# Patient Record
Sex: MALE | Race: WHITE | NOT HISPANIC OR LATINO | Employment: OTHER | URBAN - NONMETROPOLITAN AREA
[De-identification: names, ages, dates, MRNs, and addresses within clinical notes are randomized per-mention and may not be internally consistent; named-entity substitution may affect disease eponyms.]

---

## 2017-01-01 ENCOUNTER — INFUSION (OUTPATIENT)
Dept: ONCOLOGY | Facility: HOSPITAL | Age: 82
End: 2017-01-01

## 2017-01-01 ENCOUNTER — APPOINTMENT (OUTPATIENT)
Dept: GENERAL RADIOLOGY | Facility: HOSPITAL | Age: 82
End: 2017-01-01

## 2017-01-01 ENCOUNTER — APPOINTMENT (OUTPATIENT)
Dept: NUCLEAR MEDICINE | Facility: HOSPITAL | Age: 82
End: 2017-01-01

## 2017-01-01 ENCOUNTER — HOSPITAL ENCOUNTER (INPATIENT)
Facility: HOSPITAL | Age: 82
LOS: 4 days | Discharge: HOME HEALTH | End: 2017-05-25
Attending: INTERNAL MEDICINE | Admitting: FAMILY MEDICINE

## 2017-01-01 ENCOUNTER — OFFICE VISIT (OUTPATIENT)
Dept: ONCOLOGY | Facility: CLINIC | Age: 82
End: 2017-01-01

## 2017-01-01 ENCOUNTER — APPOINTMENT (OUTPATIENT)
Dept: CT IMAGING | Facility: HOSPITAL | Age: 82
End: 2017-01-01

## 2017-01-01 ENCOUNTER — HOSPITAL ENCOUNTER (EMERGENCY)
Facility: HOSPITAL | Age: 82
Discharge: HOME | End: 2017-06-15
Attending: EMERGENCY MEDICINE | Admitting: EMERGENCY MEDICINE

## 2017-01-01 ENCOUNTER — OFFICE VISIT (OUTPATIENT)
Dept: SURGERY | Facility: CLINIC | Age: 82
End: 2017-01-01

## 2017-01-01 ENCOUNTER — OFFICE VISIT (OUTPATIENT)
Dept: PULMONOLOGY | Facility: CLINIC | Age: 82
End: 2017-01-01

## 2017-01-01 ENCOUNTER — APPOINTMENT (OUTPATIENT)
Dept: CARDIOLOGY | Facility: HOSPITAL | Age: 82
End: 2017-01-01
Attending: INTERNAL MEDICINE

## 2017-01-01 ENCOUNTER — APPOINTMENT (OUTPATIENT)
Dept: ULTRASOUND IMAGING | Facility: HOSPITAL | Age: 82
End: 2017-01-01

## 2017-01-01 ENCOUNTER — ANESTHESIA EVENT (OUTPATIENT)
Dept: PERIOP | Facility: HOSPITAL | Age: 82
End: 2017-01-01

## 2017-01-01 ENCOUNTER — HOSPITAL ENCOUNTER (EMERGENCY)
Facility: HOSPITAL | Age: 82
Discharge: HOME | End: 2017-06-02
Attending: EMERGENCY MEDICINE | Admitting: EMERGENCY MEDICINE

## 2017-01-01 ENCOUNTER — TELEPHONE (OUTPATIENT)
Dept: ONCOLOGY | Facility: HOSPITAL | Age: 82
End: 2017-01-01

## 2017-01-01 ENCOUNTER — HOSPITAL ENCOUNTER (OUTPATIENT)
Dept: CT IMAGING | Facility: HOSPITAL | Age: 82
Discharge: HOME | End: 2017-08-15
Attending: INTERNAL MEDICINE | Admitting: INTERNAL MEDICINE

## 2017-01-01 ENCOUNTER — HOSPITAL ENCOUNTER (INPATIENT)
Facility: HOSPITAL | Age: 82
LOS: 9 days | Discharge: HOME | End: 2017-05-21
Attending: EMERGENCY MEDICINE | Admitting: INTERNAL MEDICINE

## 2017-01-01 ENCOUNTER — LAB (OUTPATIENT)
Dept: ONCOLOGY | Facility: CLINIC | Age: 82
End: 2017-01-01

## 2017-01-01 ENCOUNTER — ANESTHESIA (OUTPATIENT)
Dept: PERIOP | Facility: HOSPITAL | Age: 82
End: 2017-01-01

## 2017-01-01 ENCOUNTER — CONSULT (OUTPATIENT)
Dept: ONCOLOGY | Facility: CLINIC | Age: 82
End: 2017-01-01

## 2017-01-01 ENCOUNTER — HOSPITAL ENCOUNTER (OUTPATIENT)
Dept: CT IMAGING | Facility: HOSPITAL | Age: 82
Discharge: HOME | End: 2017-08-15
Attending: INTERNAL MEDICINE

## 2017-01-01 VITALS
HEART RATE: 106 BPM | WEIGHT: 128.8 LBS | HEIGHT: 66 IN | SYSTOLIC BLOOD PRESSURE: 132 MMHG | TEMPERATURE: 96.8 F | BODY MASS INDEX: 20.7 KG/M2 | OXYGEN SATURATION: 95 % | RESPIRATION RATE: 18 BRPM | DIASTOLIC BLOOD PRESSURE: 68 MMHG

## 2017-01-01 VITALS
SYSTOLIC BLOOD PRESSURE: 109 MMHG | TEMPERATURE: 97.9 F | BODY MASS INDEX: 18.85 KG/M2 | HEART RATE: 101 BPM | DIASTOLIC BLOOD PRESSURE: 67 MMHG | HEART RATE: 101 BPM | WEIGHT: 116.8 LBS | TEMPERATURE: 97.9 F | SYSTOLIC BLOOD PRESSURE: 109 MMHG | RESPIRATION RATE: 18 BRPM | RESPIRATION RATE: 18 BRPM | OXYGEN SATURATION: 98 % | BODY MASS INDEX: 18.85 KG/M2 | WEIGHT: 116.8 LBS | OXYGEN SATURATION: 98 % | DIASTOLIC BLOOD PRESSURE: 67 MMHG

## 2017-01-01 VITALS
BODY MASS INDEX: 20.25 KG/M2 | DIASTOLIC BLOOD PRESSURE: 70 MMHG | HEART RATE: 94 BPM | HEIGHT: 66 IN | OXYGEN SATURATION: 96 % | SYSTOLIC BLOOD PRESSURE: 147 MMHG | WEIGHT: 126 LBS

## 2017-01-01 VITALS
RESPIRATION RATE: 18 BRPM | SYSTOLIC BLOOD PRESSURE: 135 MMHG | DIASTOLIC BLOOD PRESSURE: 54 MMHG | OXYGEN SATURATION: 97 % | WEIGHT: 127.1 LBS | BODY MASS INDEX: 20.43 KG/M2 | TEMPERATURE: 97.1 F | HEART RATE: 93 BPM | HEIGHT: 66 IN

## 2017-01-01 VITALS — HEIGHT: 66 IN | BODY MASS INDEX: 20.57 KG/M2 | WEIGHT: 128 LBS

## 2017-01-01 VITALS
DIASTOLIC BLOOD PRESSURE: 61 MMHG | HEART RATE: 79 BPM | RESPIRATION RATE: 18 BRPM | SYSTOLIC BLOOD PRESSURE: 106 MMHG | OXYGEN SATURATION: 78 % | TEMPERATURE: 97.6 F

## 2017-01-01 VITALS
DIASTOLIC BLOOD PRESSURE: 70 MMHG | SYSTOLIC BLOOD PRESSURE: 121 MMHG | BODY MASS INDEX: 19.53 KG/M2 | HEART RATE: 91 BPM | WEIGHT: 121 LBS | OXYGEN SATURATION: 98 % | RESPIRATION RATE: 18 BRPM

## 2017-01-01 VITALS
TEMPERATURE: 97.2 F | DIASTOLIC BLOOD PRESSURE: 70 MMHG | RESPIRATION RATE: 18 BRPM | WEIGHT: 120 LBS | BODY MASS INDEX: 19.37 KG/M2 | BODY MASS INDEX: 19.53 KG/M2 | HEART RATE: 93 BPM | OXYGEN SATURATION: 98 % | HEART RATE: 91 BPM | RESPIRATION RATE: 20 BRPM | SYSTOLIC BLOOD PRESSURE: 121 MMHG | DIASTOLIC BLOOD PRESSURE: 78 MMHG | SYSTOLIC BLOOD PRESSURE: 130 MMHG | WEIGHT: 121 LBS | OXYGEN SATURATION: 100 %

## 2017-01-01 VITALS
DIASTOLIC BLOOD PRESSURE: 62 MMHG | HEIGHT: 66 IN | BODY MASS INDEX: 21.53 KG/M2 | RESPIRATION RATE: 18 BRPM | WEIGHT: 134 LBS | SYSTOLIC BLOOD PRESSURE: 128 MMHG | TEMPERATURE: 98 F | OXYGEN SATURATION: 99 % | HEART RATE: 88 BPM

## 2017-01-01 VITALS
DIASTOLIC BLOOD PRESSURE: 98 MMHG | OXYGEN SATURATION: 97 % | RESPIRATION RATE: 18 BRPM | HEART RATE: 96 BPM | TEMPERATURE: 97.7 F | BODY MASS INDEX: 21.45 KG/M2 | WEIGHT: 133.5 LBS | SYSTOLIC BLOOD PRESSURE: 136 MMHG | HEIGHT: 66 IN

## 2017-01-01 VITALS
SYSTOLIC BLOOD PRESSURE: 107 MMHG | HEART RATE: 88 BPM | TEMPERATURE: 97.9 F | OXYGEN SATURATION: 98 % | DIASTOLIC BLOOD PRESSURE: 61 MMHG | RESPIRATION RATE: 18 BRPM | WEIGHT: 121.6 LBS | BODY MASS INDEX: 19.63 KG/M2

## 2017-01-01 VITALS — BODY MASS INDEX: 20.57 KG/M2 | HEIGHT: 66 IN | WEIGHT: 128 LBS

## 2017-01-01 VITALS
OXYGEN SATURATION: 78 % | HEART RATE: 79 BPM | TEMPERATURE: 97.6 F | RESPIRATION RATE: 18 BRPM | DIASTOLIC BLOOD PRESSURE: 61 MMHG | WEIGHT: 126.6 LBS | BODY MASS INDEX: 20.43 KG/M2 | SYSTOLIC BLOOD PRESSURE: 106 MMHG

## 2017-01-01 VITALS
SYSTOLIC BLOOD PRESSURE: 122 MMHG | TEMPERATURE: 97 F | DIASTOLIC BLOOD PRESSURE: 67 MMHG | BODY MASS INDEX: 20.47 KG/M2 | HEART RATE: 99 BPM | RESPIRATION RATE: 18 BRPM | WEIGHT: 126.8 LBS | OXYGEN SATURATION: 97 %

## 2017-01-01 VITALS
DIASTOLIC BLOOD PRESSURE: 77 MMHG | BODY MASS INDEX: 20.57 KG/M2 | TEMPERATURE: 97.7 F | WEIGHT: 128 LBS | RESPIRATION RATE: 18 BRPM | HEIGHT: 66 IN | HEART RATE: 80 BPM | OXYGEN SATURATION: 98 % | SYSTOLIC BLOOD PRESSURE: 145 MMHG

## 2017-01-01 DIAGNOSIS — Z85.46 HISTORY OF PROSTATE CANCER: ICD-10-CM

## 2017-01-01 DIAGNOSIS — J90 PLEURAL EFFUSION, LEFT: Primary | ICD-10-CM

## 2017-01-01 DIAGNOSIS — C61 PROSTATE CANCER METASTATIC TO BONE (HCC): ICD-10-CM

## 2017-01-01 DIAGNOSIS — C61 STAGE IV ADENOCARCINOMA OF PROSTATE (HCC): ICD-10-CM

## 2017-01-01 DIAGNOSIS — C61 MALIGNANT NEOPLASM OF PROSTATE (HCC): Primary | ICD-10-CM

## 2017-01-01 DIAGNOSIS — C79.51 PROSTATE CANCER METASTATIC TO BONE (HCC): Primary | ICD-10-CM

## 2017-01-01 DIAGNOSIS — Z78.9 IMPAIRED MOBILITY AND ADLS: ICD-10-CM

## 2017-01-01 DIAGNOSIS — C61 PROSTATE CANCER METASTATIC TO BONE (HCC): Primary | ICD-10-CM

## 2017-01-01 DIAGNOSIS — J95.89 OTHER POSTOPERATIVE COMPLICATION INVOLVING RESPIRATORY SYSTEM: ICD-10-CM

## 2017-01-01 DIAGNOSIS — C79.51 PROSTATE CANCER METASTATIC TO BONE (HCC): ICD-10-CM

## 2017-01-01 DIAGNOSIS — J90 PLEURAL EFFUSION, LEFT: ICD-10-CM

## 2017-01-01 DIAGNOSIS — C61 STAGE IV ADENOCARCINOMA OF PROSTATE (HCC): Primary | ICD-10-CM

## 2017-01-01 DIAGNOSIS — R91.8 LUNG MASS: Primary | ICD-10-CM

## 2017-01-01 DIAGNOSIS — R06.00 DYSPNEA, UNSPECIFIED TYPE: ICD-10-CM

## 2017-01-01 DIAGNOSIS — C61 MALIGNANT NEOPLASM OF PROSTATE (HCC): ICD-10-CM

## 2017-01-01 DIAGNOSIS — Z48.89 SUTURE CHECK: Primary | ICD-10-CM

## 2017-01-01 DIAGNOSIS — Z74.09 IMPAIRED FUNCTIONAL MOBILITY, BALANCE, GAIT, AND ENDURANCE: Primary | ICD-10-CM

## 2017-01-01 DIAGNOSIS — J90 PLEURAL EFFUSION: ICD-10-CM

## 2017-01-01 DIAGNOSIS — E55.9 VITAMIN D DEFICIENCY: ICD-10-CM

## 2017-01-01 DIAGNOSIS — R97.20 ELEVATED PSA: ICD-10-CM

## 2017-01-01 DIAGNOSIS — J90 RECURRENT LEFT PLEURAL EFFUSION: Primary | ICD-10-CM

## 2017-01-01 DIAGNOSIS — E87.1 HYPONATREMIA: ICD-10-CM

## 2017-01-01 DIAGNOSIS — Z74.09 IMPAIRED MOBILITY AND ADLS: ICD-10-CM

## 2017-01-01 DIAGNOSIS — J91.0 MALIGNANT PLEURAL EFFUSION: Primary | ICD-10-CM

## 2017-01-01 LAB
25(OH)D3 SERPL-MCNC: 30 NG/ML
6-ACETYL MORPHINE: NEGATIVE
A-A DO2: 25 MMHG (ref 0–300)
A-A DO2: 66.5 MMHG (ref 0–300)
ACID FAST STN SPEC: NEGATIVE
ALBUMIN SERPL-MCNC: 2.6 G/DL (ref 3.4–4.8)
ALBUMIN SERPL-MCNC: 2.7 G/DL (ref 3.2–4.8)
ALBUMIN SERPL-MCNC: 2.7 G/DL (ref 3.4–4.8)
ALBUMIN SERPL-MCNC: 2.7 G/DL (ref 3.4–4.8)
ALBUMIN SERPL-MCNC: 2.8 G/DL (ref 3.4–4.8)
ALBUMIN SERPL-MCNC: 2.9 G/DL (ref 3.4–4.8)
ALBUMIN SERPL-MCNC: 2.9 G/DL (ref 3.4–4.8)
ALBUMIN SERPL-MCNC: 3 G/DL (ref 3.4–4.8)
ALBUMIN SERPL-MCNC: 3.2 G/DL (ref 3.4–4.8)
ALBUMIN SERPL-MCNC: 3.2 G/DL (ref 3.4–4.8)
ALBUMIN SERPL-MCNC: 3.3 G/DL (ref 3.4–4.8)
ALBUMIN SERPL-MCNC: 3.4 G/DL (ref 3.4–4.8)
ALBUMIN SERPL-MCNC: 3.4 G/DL (ref 3.4–4.8)
ALBUMIN SERPL-MCNC: 3.6 G/DL (ref 3.4–4.8)
ALBUMIN SERPL-MCNC: 4 G/DL (ref 3.4–4.8)
ALBUMIN SERPL-MCNC: 4.1 G/DL (ref 3.4–4.8)
ALBUMIN SERPL-MCNC: 4.1 G/DL (ref 3.4–4.8)
ALBUMIN/GLOB SERPL: 1 G/DL (ref 1.5–2.5)
ALBUMIN/GLOB SERPL: 1.1 G/DL (ref 1.5–2.5)
ALBUMIN/GLOB SERPL: 1.2 G/DL (ref 1.5–2.5)
ALBUMIN/GLOB SERPL: 1.3 G/DL (ref 1.5–2.5)
ALP SERPL-CCNC: 103 U/L (ref 25–100)
ALP SERPL-CCNC: 105 U/L (ref 40–129)
ALP SERPL-CCNC: 108 U/L (ref 40–129)
ALP SERPL-CCNC: 109 U/L (ref 40–129)
ALP SERPL-CCNC: 113 U/L (ref 40–129)
ALP SERPL-CCNC: 115 U/L (ref 40–129)
ALP SERPL-CCNC: 119 U/L (ref 40–129)
ALP SERPL-CCNC: 120 U/L (ref 40–129)
ALP SERPL-CCNC: 127 U/L (ref 40–129)
ALP SERPL-CCNC: 137 U/L (ref 40–129)
ALP SERPL-CCNC: 157 U/L (ref 40–129)
ALP SERPL-CCNC: 163 U/L (ref 40–129)
ALP SERPL-CCNC: 163 U/L (ref 40–129)
ALP SERPL-CCNC: 178 U/L (ref 40–129)
ALP SERPL-CCNC: 234 U/L (ref 40–129)
ALP SERPL-CCNC: 273 U/L (ref 40–129)
ALP SERPL-CCNC: 314 U/L (ref 40–129)
ALP SERPL-CCNC: 359 U/L (ref 40–129)
ALP SERPL-CCNC: 387 U/L (ref 40–129)
ALT SERPL W P-5'-P-CCNC: 11 U/L (ref 10–44)
ALT SERPL W P-5'-P-CCNC: 12 U/L (ref 10–44)
ALT SERPL W P-5'-P-CCNC: 13 U/L (ref 10–44)
ALT SERPL W P-5'-P-CCNC: 13 U/L (ref 10–44)
ALT SERPL W P-5'-P-CCNC: 16 U/L (ref 10–44)
ALT SERPL W P-5'-P-CCNC: 17 U/L (ref 10–44)
ALT SERPL W P-5'-P-CCNC: 18 U/L (ref 10–44)
ALT SERPL W P-5'-P-CCNC: 18 U/L (ref 7–40)
ALT SERPL W P-5'-P-CCNC: 19 U/L (ref 10–44)
ALT SERPL W P-5'-P-CCNC: 19 U/L (ref 10–44)
ALT SERPL W P-5'-P-CCNC: 20 U/L (ref 10–44)
ALT SERPL W P-5'-P-CCNC: 29 U/L (ref 10–44)
AMPHET+METHAMPHET UR QL: NEGATIVE
ANION GAP SERPL CALCULATED.3IONS-SCNC: 1.2 MMOL/L (ref 3.6–11.2)
ANION GAP SERPL CALCULATED.3IONS-SCNC: 1.3 MMOL/L (ref 3.6–11.2)
ANION GAP SERPL CALCULATED.3IONS-SCNC: 2.3 MMOL/L (ref 3.6–11.2)
ANION GAP SERPL CALCULATED.3IONS-SCNC: 2.8 MMOL/L (ref 3.6–11.2)
ANION GAP SERPL CALCULATED.3IONS-SCNC: 4 MMOL/L (ref 3–11)
ANION GAP SERPL CALCULATED.3IONS-SCNC: 4 MMOL/L (ref 3–11)
ANION GAP SERPL CALCULATED.3IONS-SCNC: 4.1 MMOL/L (ref 3.6–11.2)
ANION GAP SERPL CALCULATED.3IONS-SCNC: 4.2 MMOL/L (ref 3.6–11.2)
ANION GAP SERPL CALCULATED.3IONS-SCNC: 5.1 MMOL/L (ref 3.6–11.2)
ANION GAP SERPL CALCULATED.3IONS-SCNC: 5.1 MMOL/L (ref 3.6–11.2)
ANION GAP SERPL CALCULATED.3IONS-SCNC: 5.7 MMOL/L (ref 3.6–11.2)
ANION GAP SERPL CALCULATED.3IONS-SCNC: 6.1 MMOL/L (ref 3.6–11.2)
ANION GAP SERPL CALCULATED.3IONS-SCNC: 6.3 MMOL/L (ref 3.6–11.2)
ANION GAP SERPL CALCULATED.3IONS-SCNC: 6.4 MMOL/L (ref 3.6–11.2)
ANION GAP SERPL CALCULATED.3IONS-SCNC: 6.5 MMOL/L (ref 3.6–11.2)
ANION GAP SERPL CALCULATED.3IONS-SCNC: 7.2 MMOL/L (ref 3.6–11.2)
ANION GAP SERPL CALCULATED.3IONS-SCNC: 7.5 MMOL/L (ref 3.6–11.2)
ANION GAP SERPL CALCULATED.3IONS-SCNC: 7.7 MMOL/L (ref 3.6–11.2)
ANION GAP SERPL CALCULATED.3IONS-SCNC: 7.8 MMOL/L (ref 3.6–11.2)
ANION GAP SERPL CALCULATED.3IONS-SCNC: 8 MMOL/L (ref 3–11)
ANION GAP SERPL CALCULATED.3IONS-SCNC: 9.1 MMOL/L (ref 3.6–11.2)
ANISOCYTOSIS BLD QL: NORMAL
APPEARANCE FLD: ABNORMAL
APTT PPP: 25.4 SECONDS (ref 24.4–31)
APTT PPP: 26.6 SECONDS (ref 24.4–31)
ARTERIAL PATENCY WRIST A: POSITIVE
ARTERIAL PATENCY WRIST A: POSITIVE
ARTICHOKE IGE QN: 60 MG/DL (ref 0–130)
AST SERPL-CCNC: 17 U/L (ref 10–34)
AST SERPL-CCNC: 18 U/L (ref 10–34)
AST SERPL-CCNC: 18 U/L (ref 10–34)
AST SERPL-CCNC: 19 U/L (ref 10–34)
AST SERPL-CCNC: 20 U/L (ref 10–34)
AST SERPL-CCNC: 20 U/L (ref 10–34)
AST SERPL-CCNC: 21 U/L (ref 10–34)
AST SERPL-CCNC: 22 U/L (ref 10–34)
AST SERPL-CCNC: 22 U/L (ref 10–34)
AST SERPL-CCNC: 23 U/L (ref 10–34)
AST SERPL-CCNC: 23 U/L (ref 10–34)
AST SERPL-CCNC: 24 U/L (ref 0–33)
AST SERPL-CCNC: 24 U/L (ref 10–34)
AST SERPL-CCNC: 27 U/L (ref 10–34)
AST SERPL-CCNC: 27 U/L (ref 10–34)
AST SERPL-CCNC: 30 U/L (ref 10–34)
AST SERPL-CCNC: 35 U/L (ref 10–34)
ATMOSPHERIC PRESS: 720 MMHG
ATMOSPHERIC PRESS: 727 MMHG
BACTERIA FLD CULT: NORMAL
BACTERIA SPEC AEROBE CULT: NEGATIVE
BACTERIA SPEC AEROBE CULT: NORMAL
BACTERIA SPEC RESP CULT: NORMAL
BARBITURATES UR QL SCN: NEGATIVE
BASE EXCESS BLDA CALC-SCNC: -1.1 MMOL/L
BASE EXCESS BLDA CALC-SCNC: 3.1 MMOL/L
BASOPHILS # BLD AUTO: 0.01 10*3/MM3 (ref 0–0.3)
BASOPHILS # BLD AUTO: 0.02 10*3/MM3 (ref 0–0.2)
BASOPHILS # BLD AUTO: 0.02 10*3/MM3 (ref 0–0.3)
BASOPHILS # BLD AUTO: 0.03 10*3/MM3 (ref 0–0.2)
BASOPHILS # BLD AUTO: 0.03 10*3/MM3 (ref 0–0.3)
BASOPHILS # BLD AUTO: 0.04 10*3/MM3 (ref 0–0.2)
BASOPHILS # BLD AUTO: 0.06 10*3/MM3 (ref 0–0.3)
BASOPHILS # BLD AUTO: 0.07 10*3/MM3 (ref 0–0.3)
BASOPHILS # BLD AUTO: 0.08 10*3/MM3 (ref 0–0.3)
BASOPHILS NFR BLD AUTO: 0 % (ref 0–2)
BASOPHILS NFR BLD AUTO: 0.1 % (ref 0–2)
BASOPHILS NFR BLD AUTO: 0.1 % (ref 0–2)
BASOPHILS NFR BLD AUTO: 0.2 % (ref 0–1)
BASOPHILS NFR BLD AUTO: 0.2 % (ref 0–2)
BASOPHILS NFR BLD AUTO: 0.3 % (ref 0–1)
BASOPHILS NFR BLD AUTO: 0.3 % (ref 0–2)
BASOPHILS NFR BLD AUTO: 0.4 % (ref 0–1)
BASOPHILS NFR BLD AUTO: 0.4 % (ref 0–2)
BASOPHILS NFR BLD AUTO: 0.5 % (ref 0–2)
BASOPHILS NFR BLD AUTO: 0.6 % (ref 0–2)
BDY SITE: ABNORMAL
BDY SITE: ABNORMAL
BENZODIAZ UR QL SCN: NEGATIVE
BH CV ECHO MEAS - % IVS THICK: 20 %
BH CV ECHO MEAS - % LVPW THICK: -11.5 %
BH CV ECHO MEAS - AI DEC SLOPE: 188.5 CM/SEC^2
BH CV ECHO MEAS - BSA(HAYCOCK): 1.8 M^2
BH CV ECHO MEAS - BSA: 1.8 M^2
BH CV ECHO MEAS - BZI_BMI: 23.9 KILOGRAMS/M^2
BH CV ECHO MEAS - BZI_METRIC_HEIGHT: 167.6 CM
BH CV ECHO MEAS - BZI_METRIC_WEIGHT: 67.1 KG
BH CV ECHO MEAS - EDV(CUBED): 54.5 ML
BH CV ECHO MEAS - EDV(TEICH): 61.6 ML
BH CV ECHO MEAS - EF(CUBED): 78.9 %
BH CV ECHO MEAS - EF(TEICH): 71.9 %
BH CV ECHO MEAS - ESV(CUBED): 11.5 ML
BH CV ECHO MEAS - ESV(TEICH): 17.3 ML
BH CV ECHO MEAS - FS: 40.4 %
BH CV ECHO MEAS - IVS/LVPW: 0.77
BH CV ECHO MEAS - IVSD: 0.81 CM
BH CV ECHO MEAS - IVSS: 0.97 CM
BH CV ECHO MEAS - LV MASS(C)D: 105 GRAMS
BH CV ECHO MEAS - LV MASS(C)DI: 59.7 GRAMS/M^2
BH CV ECHO MEAS - LV MASS(C)S: 50.7 GRAMS
BH CV ECHO MEAS - LV MASS(C)SI: 28.8 GRAMS/M^2
BH CV ECHO MEAS - LVIDD: 3.8 CM
BH CV ECHO MEAS - LVIDS: 2.3 CM
BH CV ECHO MEAS - LVPWD: 1 CM
BH CV ECHO MEAS - LVPWS: 0.93 CM
BH CV ECHO MEAS - MV A MAX VEL: 63.2 CM/SEC
BH CV ECHO MEAS - MV DEC SLOPE: 232 CM/SEC^2
BH CV ECHO MEAS - MV E MAX VEL: 69.1 CM/SEC
BH CV ECHO MEAS - MV E/A: 1.1
BH CV ECHO MEAS - MV P1/2T MAX VEL: 72.1 CM/SEC
BH CV ECHO MEAS - MV P1/2T: 91 MSEC
BH CV ECHO MEAS - MVA P1/2T LCG: 3.1 CM^2
BH CV ECHO MEAS - MVA(P1/2T): 2.4 CM^2
BH CV ECHO MEAS - RAP SYSTOLE: 10 MMHG
BH CV ECHO MEAS - RVDD: 0.89 CM
BH CV ECHO MEAS - RVSP: 28.9 MMHG
BH CV ECHO MEAS - SI(CUBED): 24.4 ML/M^2
BH CV ECHO MEAS - SI(TEICH): 25.2 ML/M^2
BH CV ECHO MEAS - SV(CUBED): 43 ML
BH CV ECHO MEAS - SV(TEICH): 44.3 ML
BH CV ECHO MEAS - TR MAX VEL: 217.2 CM/SEC
BILIRUB SERPL-MCNC: 0.2 MG/DL (ref 0.2–1.8)
BILIRUB SERPL-MCNC: 0.2 MG/DL (ref 0.3–1.2)
BILIRUB SERPL-MCNC: 0.3 MG/DL (ref 0.2–1.8)
BILIRUB SERPL-MCNC: 0.4 MG/DL (ref 0.2–1.8)
BILIRUB SERPL-MCNC: 0.5 MG/DL (ref 0.2–1.8)
BNP SERPL-MCNC: 32 PG/ML (ref 0–100)
BNP SERPL-MCNC: 89 PG/ML (ref 0–100)
BODY TEMPERATURE: 98.6 C
BODY TEMPERATURE: 98.6 C
BUN BLD-MCNC: 11 MG/DL (ref 9–23)
BUN BLD-MCNC: 12 MG/DL (ref 7–21)
BUN BLD-MCNC: 12 MG/DL (ref 9–23)
BUN BLD-MCNC: 13 MG/DL (ref 7–21)
BUN BLD-MCNC: 14 MG/DL (ref 7–21)
BUN BLD-MCNC: 14 MG/DL (ref 9–23)
BUN BLD-MCNC: 15 MG/DL (ref 7–21)
BUN BLD-MCNC: 16 MG/DL (ref 7–21)
BUN BLD-MCNC: 16 MG/DL (ref 7–21)
BUN BLD-MCNC: 17 MG/DL (ref 7–21)
BUN BLD-MCNC: 17 MG/DL (ref 7–21)
BUN BLD-MCNC: 18 MG/DL (ref 7–21)
BUN BLD-MCNC: 18 MG/DL (ref 7–21)
BUN BLD-MCNC: 19 MG/DL (ref 7–21)
BUN BLD-MCNC: 20 MG/DL (ref 7–21)
BUN BLD-MCNC: 22 MG/DL (ref 7–21)
BUN BLD-MCNC: 26 MG/DL (ref 7–21)
BUN BLD-MCNC: 27 MG/DL (ref 7–21)
BUN BLD-MCNC: 27 MG/DL (ref 7–21)
BUN/CREAT SERPL: 12.2 (ref 7–25)
BUN/CREAT SERPL: 14.9 (ref 7–25)
BUN/CREAT SERPL: 15.8 (ref 7–25)
BUN/CREAT SERPL: 16.2 (ref 7–25)
BUN/CREAT SERPL: 16.2 (ref 7–25)
BUN/CREAT SERPL: 16.8 (ref 7–25)
BUN/CREAT SERPL: 16.9 (ref 7–25)
BUN/CREAT SERPL: 17.1 (ref 7–25)
BUN/CREAT SERPL: 17.5 (ref 7–25)
BUN/CREAT SERPL: 17.7 (ref 7–25)
BUN/CREAT SERPL: 18.6 (ref 7–25)
BUN/CREAT SERPL: 18.9 (ref 7–25)
BUN/CREAT SERPL: 19.4 (ref 7–25)
BUN/CREAT SERPL: 21.1 (ref 7–25)
BUN/CREAT SERPL: 21.6 (ref 7–25)
BUN/CREAT SERPL: 21.8 (ref 7–25)
BUN/CREAT SERPL: 22.4 (ref 7–25)
BUN/CREAT SERPL: 23.7 (ref 7–25)
BUN/CREAT SERPL: 23.8 (ref 7–25)
BUN/CREAT SERPL: 24.2 (ref 7–25)
BUN/CREAT SERPL: 28.9 (ref 7–25)
BUPRENORPHINE SERPL-MCNC: NEGATIVE NG/ML
CALCIUM SPEC-SCNC: 8.1 MG/DL (ref 7.7–10)
CALCIUM SPEC-SCNC: 8.2 MG/DL (ref 7.7–10)
CALCIUM SPEC-SCNC: 8.4 MG/DL (ref 7.7–10)
CALCIUM SPEC-SCNC: 8.4 MG/DL (ref 7.7–10)
CALCIUM SPEC-SCNC: 8.6 MG/DL (ref 7.7–10)
CALCIUM SPEC-SCNC: 8.6 MG/DL (ref 7.7–10)
CALCIUM SPEC-SCNC: 8.6 MG/DL (ref 8.7–10.4)
CALCIUM SPEC-SCNC: 8.6 MG/DL (ref 8.7–10.4)
CALCIUM SPEC-SCNC: 8.7 MG/DL (ref 7.7–10)
CALCIUM SPEC-SCNC: 8.7 MG/DL (ref 8.7–10.4)
CALCIUM SPEC-SCNC: 8.8 MG/DL (ref 7.7–10)
CALCIUM SPEC-SCNC: 8.9 MG/DL (ref 7.7–10)
CALCIUM SPEC-SCNC: 9.4 MG/DL (ref 7.7–10)
CALCIUM SPEC-SCNC: 9.5 MG/DL (ref 7.7–10)
CALCIUM SPEC-SCNC: 9.7 MG/DL (ref 7.7–10)
CANNABINOIDS SERPL QL: NEGATIVE
CHLORIDE SERPL-SCNC: 100 MMOL/L (ref 99–112)
CHLORIDE SERPL-SCNC: 100 MMOL/L (ref 99–112)
CHLORIDE SERPL-SCNC: 101 MMOL/L (ref 99–112)
CHLORIDE SERPL-SCNC: 102 MMOL/L (ref 99–112)
CHLORIDE SERPL-SCNC: 103 MMOL/L (ref 99–109)
CHLORIDE SERPL-SCNC: 105 MMOL/L (ref 99–109)
CHLORIDE SERPL-SCNC: 105 MMOL/L (ref 99–112)
CHLORIDE SERPL-SCNC: 105 MMOL/L (ref 99–112)
CHLORIDE SERPL-SCNC: 106 MMOL/L (ref 99–112)
CHLORIDE SERPL-SCNC: 106 MMOL/L (ref 99–112)
CHLORIDE SERPL-SCNC: 107 MMOL/L (ref 99–112)
CHLORIDE SERPL-SCNC: 108 MMOL/L (ref 99–109)
CHLORIDE SERPL-SCNC: 94 MMOL/L (ref 99–112)
CHLORIDE SERPL-SCNC: 98 MMOL/L (ref 99–112)
CHLORIDE SERPL-SCNC: 99 MMOL/L (ref 99–112)
CHOLEST SERPL-MCNC: 113 MG/DL (ref 0–200)
CK MB SERPL-CCNC: 3.02 NG/ML (ref 0–5)
CK MB SERPL-CCNC: 3.22 NG/ML (ref 0–5)
CK MB SERPL-CCNC: 3.77 NG/ML (ref 0–5)
CK MB SERPL-RTO: 5.1 % (ref 0–3)
CK MB SERPL-RTO: 5.3 % (ref 0–3)
CK MB SERPL-RTO: 5.4 % (ref 0–3)
CK SERPL-CCNC: 57 U/L (ref 24–204)
CK SERPL-CCNC: 60 U/L (ref 24–204)
CK SERPL-CCNC: 74 U/L (ref 24–204)
CO2 SERPL-SCNC: 24.3 MMOL/L (ref 24.3–31.9)
CO2 SERPL-SCNC: 25 MMOL/L (ref 20–31)
CO2 SERPL-SCNC: 25.2 MMOL/L (ref 24.3–31.9)
CO2 SERPL-SCNC: 25.3 MMOL/L (ref 24.3–31.9)
CO2 SERPL-SCNC: 25.6 MMOL/L (ref 24.3–31.9)
CO2 SERPL-SCNC: 25.8 MMOL/L (ref 24.3–31.9)
CO2 SERPL-SCNC: 25.9 MMOL/L (ref 24.3–31.9)
CO2 SERPL-SCNC: 26 MMOL/L (ref 20–31)
CO2 SERPL-SCNC: 26.5 MMOL/L (ref 24.3–31.9)
CO2 SERPL-SCNC: 26.5 MMOL/L (ref 24.3–31.9)
CO2 SERPL-SCNC: 26.9 MMOL/L (ref 24.3–31.9)
CO2 SERPL-SCNC: 26.9 MMOL/L (ref 24.3–31.9)
CO2 SERPL-SCNC: 27.9 MMOL/L (ref 24.3–31.9)
CO2 SERPL-SCNC: 28.2 MMOL/L (ref 24.3–31.9)
CO2 SERPL-SCNC: 28.7 MMOL/L (ref 24.3–31.9)
CO2 SERPL-SCNC: 28.7 MMOL/L (ref 24.3–31.9)
CO2 SERPL-SCNC: 28.8 MMOL/L (ref 24.3–31.9)
CO2 SERPL-SCNC: 28.9 MMOL/L (ref 24.3–31.9)
CO2 SERPL-SCNC: 29.7 MMOL/L (ref 24.3–31.9)
CO2 SERPL-SCNC: 29.8 MMOL/L (ref 24.3–31.9)
CO2 SERPL-SCNC: 30 MMOL/L (ref 20–31)
COCAINE UR QL: NEGATIVE
COHGB MFR BLD: 1 % (ref 0–5)
COHGB MFR BLD: 1.4 % (ref 0–5)
COLOR FLD: ABNORMAL
CREAT BLD-MCNC: 0.7 MG/DL (ref 0.6–1.3)
CREAT BLD-MCNC: 0.72 MG/DL (ref 0.43–1.29)
CREAT BLD-MCNC: 0.76 MG/DL (ref 0.43–1.29)
CREAT BLD-MCNC: 0.8 MG/DL (ref 0.6–1.3)
CREAT BLD-MCNC: 0.84 MG/DL (ref 0.43–1.29)
CREAT BLD-MCNC: 0.86 MG/DL (ref 0.43–1.29)
CREAT BLD-MCNC: 0.87 MG/DL (ref 0.43–1.29)
CREAT BLD-MCNC: 0.89 MG/DL (ref 0.43–1.29)
CREAT BLD-MCNC: 0.9 MG/DL (ref 0.43–1.29)
CREAT BLD-MCNC: 0.9 MG/DL (ref 0.6–1.3)
CREAT BLD-MCNC: 0.91 MG/DL (ref 0.43–1.29)
CREAT BLD-MCNC: 0.95 MG/DL (ref 0.43–1.29)
CREAT BLD-MCNC: 0.96 MG/DL (ref 0.43–1.29)
CREAT BLD-MCNC: 0.98 MG/DL (ref 0.43–1.29)
CREAT BLD-MCNC: 1.02 MG/DL (ref 0.43–1.29)
CREAT BLD-MCNC: 1.05 MG/DL (ref 0.43–1.29)
CREAT BLD-MCNC: 1.13 MG/DL (ref 0.43–1.29)
CREAT BLD-MCNC: 1.24 MG/DL (ref 0.43–1.29)
CREAT BLD-MCNC: 1.67 MG/DL (ref 0.43–1.29)
CRP SERPL-MCNC: 1.5 MG/DL (ref 0–0.99)
CRP SERPL-MCNC: 1.69 MG/DL (ref 0–0.99)
CRP SERPL-MCNC: 3.07 MG/DL (ref 0–0.99)
CRP SERPL-MCNC: 4.03 MG/DL (ref 0–0.99)
CRP SERPL-MCNC: 4.25 MG/DL (ref 0–0.99)
CRP SERPL-MCNC: 7.02 MG/DL (ref 0–0.99)
CRP SERPL-MCNC: 8.45 MG/DL (ref 0–0.99)
DEPRECATED RDW RBC AUTO: 37.5 FL (ref 37–54)
DEPRECATED RDW RBC AUTO: 37.7 FL (ref 37–54)
DEPRECATED RDW RBC AUTO: 38.1 FL (ref 37–54)
DEPRECATED RDW RBC AUTO: 38.1 FL (ref 37–54)
DEPRECATED RDW RBC AUTO: 38.2 FL (ref 37–54)
DEPRECATED RDW RBC AUTO: 38.2 FL (ref 37–54)
DEPRECATED RDW RBC AUTO: 38.3 FL (ref 37–54)
DEPRECATED RDW RBC AUTO: 38.4 FL (ref 37–54)
DEPRECATED RDW RBC AUTO: 38.5 FL (ref 37–54)
DEPRECATED RDW RBC AUTO: 38.8 FL (ref 37–54)
DEPRECATED RDW RBC AUTO: 39.1 FL (ref 37–54)
DEPRECATED RDW RBC AUTO: 40.2 FL (ref 37–54)
DEPRECATED RDW RBC AUTO: 40.3 FL (ref 37–54)
DEPRECATED RDW RBC AUTO: 41.5 FL (ref 37–54)
DEPRECATED RDW RBC AUTO: 42.2 FL (ref 37–54)
DEPRECATED RDW RBC AUTO: 42.9 FL (ref 37–54)
DEPRECATED RDW RBC AUTO: 43.1 FL (ref 37–54)
DEPRECATED RDW RBC AUTO: 43.6 FL (ref 37–54)
DEPRECATED RDW RBC AUTO: 46.1 FL (ref 37–54)
DEPRECATED RDW RBC AUTO: 49.3 FL (ref 37–54)
DEPRECATED RDW RBC AUTO: 50.6 FL (ref 37–54)
DEPRECATED RDW RBC AUTO: 52.1 FL (ref 37–54)
EOSINOPHIL # BLD AUTO: 0 10*3/MM3 (ref 0–0.7)
EOSINOPHIL # BLD AUTO: 0.01 10*3/MM3 (ref 0–0.7)
EOSINOPHIL # BLD AUTO: 0.04 10*3/MM3 (ref 0–0.7)
EOSINOPHIL # BLD AUTO: 0.08 10*3/MM3 (ref 0–0.7)
EOSINOPHIL # BLD AUTO: 0.11 10*3/MM3 (ref 0–0.7)
EOSINOPHIL # BLD AUTO: 0.11 10*3/MM3 (ref 0–0.7)
EOSINOPHIL # BLD AUTO: 0.14 10*3/MM3 (ref 0–0.7)
EOSINOPHIL # BLD AUTO: 0.16 10*3/MM3 (ref 0–0.7)
EOSINOPHIL # BLD AUTO: 0.17 10*3/MM3 (ref 0–0.7)
EOSINOPHIL # BLD AUTO: 0.21 10*3/MM3 (ref 0–0.7)
EOSINOPHIL # BLD AUTO: 0.24 10*3/MM3 (ref 0–0.7)
EOSINOPHIL # BLD AUTO: 0.29 10*3/MM3 (ref 0–0.7)
EOSINOPHIL # BLD AUTO: 0.3 10*3/MM3 (ref 0.1–0.3)
EOSINOPHIL # BLD AUTO: 0.32 10*3/MM3 (ref 0–0.7)
EOSINOPHIL # BLD AUTO: 0.35 10*3/MM3 (ref 0.1–0.3)
EOSINOPHIL # BLD AUTO: 0.35 10*3/MM3 (ref 0–0.7)
EOSINOPHIL # BLD AUTO: 0.38 10*3/MM3 (ref 0–0.7)
EOSINOPHIL # BLD AUTO: 0.44 10*3/MM3 (ref 0–0.7)
EOSINOPHIL # BLD AUTO: 0.49 10*3/MM3 (ref 0.1–0.3)
EOSINOPHIL # BLD AUTO: 0.53 10*3/MM3 (ref 0–0.7)
EOSINOPHIL # BLD AUTO: 0.67 10*3/MM3 (ref 0–0.7)
EOSINOPHIL # BLD AUTO: 0.88 10*3/MM3 (ref 0–0.7)
EOSINOPHIL NFR BLD AUTO: 0 % (ref 0–7)
EOSINOPHIL NFR BLD AUTO: 0.1 % (ref 0–7)
EOSINOPHIL NFR BLD AUTO: 0.4 % (ref 0–7)
EOSINOPHIL NFR BLD AUTO: 0.8 % (ref 0–7)
EOSINOPHIL NFR BLD AUTO: 0.9 % (ref 0–7)
EOSINOPHIL NFR BLD AUTO: 1.2 % (ref 0–7)
EOSINOPHIL NFR BLD AUTO: 1.3 % (ref 0–7)
EOSINOPHIL NFR BLD AUTO: 1.9 % (ref 0–7)
EOSINOPHIL NFR BLD AUTO: 1.9 % (ref 0–7)
EOSINOPHIL NFR BLD AUTO: 2 % (ref 0–7)
EOSINOPHIL NFR BLD AUTO: 2.1 % (ref 0–7)
EOSINOPHIL NFR BLD AUTO: 2.3 % (ref 0–7)
EOSINOPHIL NFR BLD AUTO: 2.7 % (ref 0–3)
EOSINOPHIL NFR BLD AUTO: 2.9 % (ref 0–7)
EOSINOPHIL NFR BLD AUTO: 3 % (ref 0–3)
EOSINOPHIL NFR BLD AUTO: 3.2 % (ref 0–7)
EOSINOPHIL NFR BLD AUTO: 3.5 % (ref 0–7)
EOSINOPHIL NFR BLD AUTO: 4.2 % (ref 0–7)
EOSINOPHIL NFR BLD AUTO: 4.4 % (ref 0–3)
EOSINOPHIL NFR BLD AUTO: 4.5 % (ref 0–7)
EOSINOPHIL NFR BLD AUTO: 4.5 % (ref 0–7)
EOSINOPHIL NFR BLD AUTO: 4.9 % (ref 0–7)
ERYTHROCYTE [DISTWIDTH] IN BLOOD BY AUTOMATED COUNT: 12.6 % (ref 11.5–14.5)
ERYTHROCYTE [DISTWIDTH] IN BLOOD BY AUTOMATED COUNT: 12.7 % (ref 11.5–14.5)
ERYTHROCYTE [DISTWIDTH] IN BLOOD BY AUTOMATED COUNT: 12.7 % (ref 11.5–14.5)
ERYTHROCYTE [DISTWIDTH] IN BLOOD BY AUTOMATED COUNT: 12.8 % (ref 11.5–14.5)
ERYTHROCYTE [DISTWIDTH] IN BLOOD BY AUTOMATED COUNT: 12.9 % (ref 11.5–14.5)
ERYTHROCYTE [DISTWIDTH] IN BLOOD BY AUTOMATED COUNT: 12.9 % (ref 11.5–14.5)
ERYTHROCYTE [DISTWIDTH] IN BLOOD BY AUTOMATED COUNT: 13 % (ref 11.5–14.5)
ERYTHROCYTE [DISTWIDTH] IN BLOOD BY AUTOMATED COUNT: 13.1 % (ref 11.5–14.5)
ERYTHROCYTE [DISTWIDTH] IN BLOOD BY AUTOMATED COUNT: 13.1 % (ref 11.5–14.5)
ERYTHROCYTE [DISTWIDTH] IN BLOOD BY AUTOMATED COUNT: 13.5 % (ref 11.3–14.5)
ERYTHROCYTE [DISTWIDTH] IN BLOOD BY AUTOMATED COUNT: 13.7 % (ref 11.3–14.5)
ERYTHROCYTE [DISTWIDTH] IN BLOOD BY AUTOMATED COUNT: 13.9 % (ref 11.5–14.5)
ERYTHROCYTE [DISTWIDTH] IN BLOOD BY AUTOMATED COUNT: 14.1 % (ref 11.3–14.5)
ERYTHROCYTE [DISTWIDTH] IN BLOOD BY AUTOMATED COUNT: 15.8 % (ref 11.5–14.5)
ERYTHROCYTE [DISTWIDTH] IN BLOOD BY AUTOMATED COUNT: 17.9 % (ref 11.5–14.5)
ERYTHROCYTE [DISTWIDTH] IN BLOOD BY AUTOMATED COUNT: 19.1 % (ref 11.5–14.5)
ERYTHROCYTE [DISTWIDTH] IN BLOOD BY AUTOMATED COUNT: 19.3 % (ref 11.5–14.5)
ERYTHROCYTE [DISTWIDTH] IN BLOOD BY AUTOMATED COUNT: 20.1 % (ref 11.5–14.5)
FERRITIN SERPL-MCNC: 168 NG/ML (ref 21.9–321.7)
FUNGUS SPEC CULT: NORMAL
FUNGUS SPEC FUNGUS STN: NORMAL
GFR SERPL CREATININE-BSD FRML MDRD: 104 ML/MIN/1.73
GFR SERPL CREATININE-BSD FRML MDRD: 107 ML/MIN/1.73
GFR SERPL CREATININE-BSD FRML MDRD: 39 ML/MIN/1.73
GFR SERPL CREATININE-BSD FRML MDRD: 55 ML/MIN/1.73
GFR SERPL CREATININE-BSD FRML MDRD: 62 ML/MIN/1.73
GFR SERPL CREATININE-BSD FRML MDRD: 67 ML/MIN/1.73
GFR SERPL CREATININE-BSD FRML MDRD: 69 ML/MIN/1.73
GFR SERPL CREATININE-BSD FRML MDRD: 73 ML/MIN/1.73
GFR SERPL CREATININE-BSD FRML MDRD: 74 ML/MIN/1.73
GFR SERPL CREATININE-BSD FRML MDRD: 75 ML/MIN/1.73
GFR SERPL CREATININE-BSD FRML MDRD: 79 ML/MIN/1.73
GFR SERPL CREATININE-BSD FRML MDRD: 80 ML/MIN/1.73
GFR SERPL CREATININE-BSD FRML MDRD: 80 ML/MIN/1.73
GFR SERPL CREATININE-BSD FRML MDRD: 81 ML/MIN/1.73
GFR SERPL CREATININE-BSD FRML MDRD: 83 ML/MIN/1.73
GFR SERPL CREATININE-BSD FRML MDRD: 85 ML/MIN/1.73
GFR SERPL CREATININE-BSD FRML MDRD: 87 ML/MIN/1.73
GFR SERPL CREATININE-BSD FRML MDRD: 92 ML/MIN/1.73
GFR SERPL CREATININE-BSD FRML MDRD: 97 ML/MIN/1.73
GLOBULIN UR ELPH-MCNC: 2.1 GM/DL
GLOBULIN UR ELPH-MCNC: 2.3 GM/DL
GLOBULIN UR ELPH-MCNC: 2.3 GM/DL
GLOBULIN UR ELPH-MCNC: 2.4 GM/DL
GLOBULIN UR ELPH-MCNC: 2.4 GM/DL
GLOBULIN UR ELPH-MCNC: 2.5 GM/DL
GLOBULIN UR ELPH-MCNC: 2.6 GM/DL
GLOBULIN UR ELPH-MCNC: 2.7 GM/DL
GLOBULIN UR ELPH-MCNC: 2.9 GM/DL
GLOBULIN UR ELPH-MCNC: 3 GM/DL
GLOBULIN UR ELPH-MCNC: 3.1 GM/DL
GLOBULIN UR ELPH-MCNC: 3.1 GM/DL
GLOBULIN UR ELPH-MCNC: 3.2 GM/DL
GLUCOSE BLD-MCNC: 102 MG/DL (ref 70–110)
GLUCOSE BLD-MCNC: 105 MG/DL (ref 70–110)
GLUCOSE BLD-MCNC: 110 MG/DL (ref 70–100)
GLUCOSE BLD-MCNC: 110 MG/DL (ref 70–110)
GLUCOSE BLD-MCNC: 111 MG/DL (ref 70–110)
GLUCOSE BLD-MCNC: 114 MG/DL (ref 70–110)
GLUCOSE BLD-MCNC: 114 MG/DL (ref 70–110)
GLUCOSE BLD-MCNC: 116 MG/DL (ref 70–100)
GLUCOSE BLD-MCNC: 117 MG/DL (ref 70–110)
GLUCOSE BLD-MCNC: 120 MG/DL (ref 70–110)
GLUCOSE BLD-MCNC: 120 MG/DL (ref 70–110)
GLUCOSE BLD-MCNC: 125 MG/DL (ref 70–110)
GLUCOSE BLD-MCNC: 127 MG/DL (ref 70–110)
GLUCOSE BLD-MCNC: 133 MG/DL (ref 70–110)
GLUCOSE BLD-MCNC: 136 MG/DL (ref 70–110)
GLUCOSE BLD-MCNC: 140 MG/DL (ref 70–110)
GLUCOSE BLD-MCNC: 143 MG/DL (ref 70–110)
GLUCOSE BLD-MCNC: 158 MG/DL (ref 70–110)
GLUCOSE BLD-MCNC: 165 MG/DL (ref 70–110)
GLUCOSE BLD-MCNC: 95 MG/DL (ref 70–100)
GLUCOSE BLD-MCNC: 97 MG/DL (ref 70–110)
GLUCOSE BLDC GLUCOMTR-MCNC: 102 MG/DL (ref 70–130)
GLUCOSE BLDC GLUCOMTR-MCNC: 103 MG/DL (ref 70–130)
GLUCOSE BLDC GLUCOMTR-MCNC: 103 MG/DL (ref 70–130)
GLUCOSE BLDC GLUCOMTR-MCNC: 105 MG/DL (ref 70–130)
GLUCOSE BLDC GLUCOMTR-MCNC: 105 MG/DL (ref 70–130)
GLUCOSE BLDC GLUCOMTR-MCNC: 106 MG/DL (ref 70–130)
GLUCOSE BLDC GLUCOMTR-MCNC: 106 MG/DL (ref 70–130)
GLUCOSE BLDC GLUCOMTR-MCNC: 109 MG/DL (ref 70–130)
GLUCOSE BLDC GLUCOMTR-MCNC: 109 MG/DL (ref 70–130)
GLUCOSE BLDC GLUCOMTR-MCNC: 110 MG/DL (ref 70–130)
GLUCOSE BLDC GLUCOMTR-MCNC: 111 MG/DL (ref 70–130)
GLUCOSE BLDC GLUCOMTR-MCNC: 112 MG/DL (ref 70–130)
GLUCOSE BLDC GLUCOMTR-MCNC: 114 MG/DL (ref 70–130)
GLUCOSE BLDC GLUCOMTR-MCNC: 114 MG/DL (ref 70–130)
GLUCOSE BLDC GLUCOMTR-MCNC: 116 MG/DL (ref 70–130)
GLUCOSE BLDC GLUCOMTR-MCNC: 116 MG/DL (ref 70–130)
GLUCOSE BLDC GLUCOMTR-MCNC: 117 MG/DL (ref 70–130)
GLUCOSE BLDC GLUCOMTR-MCNC: 117 MG/DL (ref 70–130)
GLUCOSE BLDC GLUCOMTR-MCNC: 119 MG/DL (ref 70–130)
GLUCOSE BLDC GLUCOMTR-MCNC: 121 MG/DL (ref 70–130)
GLUCOSE BLDC GLUCOMTR-MCNC: 123 MG/DL (ref 70–130)
GLUCOSE BLDC GLUCOMTR-MCNC: 124 MG/DL (ref 70–130)
GLUCOSE BLDC GLUCOMTR-MCNC: 124 MG/DL (ref 70–130)
GLUCOSE BLDC GLUCOMTR-MCNC: 126 MG/DL (ref 70–130)
GLUCOSE BLDC GLUCOMTR-MCNC: 128 MG/DL (ref 70–130)
GLUCOSE BLDC GLUCOMTR-MCNC: 129 MG/DL (ref 70–130)
GLUCOSE BLDC GLUCOMTR-MCNC: 129 MG/DL (ref 70–130)
GLUCOSE BLDC GLUCOMTR-MCNC: 131 MG/DL (ref 70–130)
GLUCOSE BLDC GLUCOMTR-MCNC: 132 MG/DL (ref 70–130)
GLUCOSE BLDC GLUCOMTR-MCNC: 136 MG/DL (ref 70–130)
GLUCOSE BLDC GLUCOMTR-MCNC: 137 MG/DL (ref 70–130)
GLUCOSE BLDC GLUCOMTR-MCNC: 138 MG/DL (ref 70–130)
GLUCOSE BLDC GLUCOMTR-MCNC: 138 MG/DL (ref 70–130)
GLUCOSE BLDC GLUCOMTR-MCNC: 141 MG/DL (ref 70–130)
GLUCOSE BLDC GLUCOMTR-MCNC: 149 MG/DL (ref 70–130)
GLUCOSE BLDC GLUCOMTR-MCNC: 154 MG/DL (ref 70–130)
GLUCOSE BLDC GLUCOMTR-MCNC: 156 MG/DL (ref 70–130)
GLUCOSE BLDC GLUCOMTR-MCNC: 157 MG/DL (ref 70–130)
GLUCOSE BLDC GLUCOMTR-MCNC: 164 MG/DL (ref 70–130)
GLUCOSE BLDC GLUCOMTR-MCNC: 165 MG/DL (ref 70–130)
GLUCOSE BLDC GLUCOMTR-MCNC: 179 MG/DL (ref 70–130)
GLUCOSE BLDC GLUCOMTR-MCNC: 201 MG/DL (ref 70–130)
GLUCOSE BLDC GLUCOMTR-MCNC: 93 MG/DL (ref 70–130)
GLUCOSE BLDC GLUCOMTR-MCNC: 95 MG/DL (ref 70–130)
GLUCOSE BLDC GLUCOMTR-MCNC: 97 MG/DL (ref 70–130)
GLUCOSE BLDC GLUCOMTR-MCNC: 98 MG/DL (ref 70–130)
GLUCOSE BLDC GLUCOMTR-MCNC: 99 MG/DL (ref 70–130)
GLUCOSE FLD-MCNC: 94 MG/DL
GRAM STN SPEC: NORMAL
HBA1C MFR BLD: 5.2 % (ref 4.8–5.6)
HBA1C MFR BLD: 6.8 % (ref 4.5–5.7)
HCO3 BLDA-SCNC: 22.8 MMOL/L (ref 22–26)
HCO3 BLDA-SCNC: 26.8 MMOL/L (ref 22–26)
HCT VFR BLD AUTO: 24.9 % (ref 42–52)
HCT VFR BLD AUTO: 24.9 % (ref 42–52)
HCT VFR BLD AUTO: 25.4 % (ref 38.9–50.9)
HCT VFR BLD AUTO: 26.5 % (ref 42–52)
HCT VFR BLD AUTO: 26.7 % (ref 42–52)
HCT VFR BLD AUTO: 26.9 % (ref 42–52)
HCT VFR BLD AUTO: 27.1 % (ref 42–52)
HCT VFR BLD AUTO: 27.2 % (ref 38.9–50.9)
HCT VFR BLD AUTO: 27.6 % (ref 38.9–50.9)
HCT VFR BLD AUTO: 28.2 % (ref 42–52)
HCT VFR BLD AUTO: 28.4 % (ref 42–52)
HCT VFR BLD AUTO: 33 % (ref 42–52)
HCT VFR BLD AUTO: 33.4 % (ref 42–52)
HCT VFR BLD AUTO: 34.5 % (ref 42–52)
HCT VFR BLD AUTO: 34.7 % (ref 42–52)
HCT VFR BLD AUTO: 34.8 % (ref 42–52)
HCT VFR BLD AUTO: 35 % (ref 42–52)
HCT VFR BLD AUTO: 35 % (ref 42–52)
HCT VFR BLD AUTO: 37.5 % (ref 42–52)
HCT VFR BLD AUTO: 37.8 % (ref 42–52)
HCT VFR BLD AUTO: 39.2 % (ref 42–52)
HCT VFR BLD AUTO: 39.6 % (ref 42–52)
HCT VFR BLD CALC: 29 % (ref 42–52)
HCT VFR BLD CALC: 38 % (ref 42–52)
HDLC SERPL-MCNC: 34 MG/DL (ref 40–60)
HGB BLD-MCNC: 10.2 G/DL (ref 14–18)
HGB BLD-MCNC: 10.2 G/DL (ref 14–18)
HGB BLD-MCNC: 10.6 G/DL (ref 14–18)
HGB BLD-MCNC: 10.8 G/DL (ref 14–18)
HGB BLD-MCNC: 10.9 G/DL (ref 14–18)
HGB BLD-MCNC: 11 G/DL (ref 14–18)
HGB BLD-MCNC: 11.1 G/DL (ref 14–18)
HGB BLD-MCNC: 11.8 G/DL (ref 14–18)
HGB BLD-MCNC: 11.9 G/DL (ref 14–18)
HGB BLD-MCNC: 12.5 G/DL (ref 14–18)
HGB BLD-MCNC: 12.7 G/DL (ref 14–18)
HGB BLD-MCNC: 7.8 G/DL (ref 14–18)
HGB BLD-MCNC: 7.9 G/DL (ref 14–18)
HGB BLD-MCNC: 8 G/DL (ref 13.1–17.5)
HGB BLD-MCNC: 8.2 G/DL (ref 13.1–17.5)
HGB BLD-MCNC: 8.2 G/DL (ref 14–18)
HGB BLD-MCNC: 8.3 G/DL (ref 14–18)
HGB BLD-MCNC: 8.5 G/DL (ref 14–18)
HGB BLD-MCNC: 8.5 G/DL (ref 14–18)
HGB BLD-MCNC: 8.6 G/DL (ref 13.1–17.5)
HGB BLD-MCNC: 8.8 G/DL (ref 14–18)
HGB BLD-MCNC: 9.2 G/DL (ref 14–18)
HGB BLDA-MCNC: 13 G/DL (ref 12–16)
HGB BLDA-MCNC: 9.8 G/DL (ref 12–16)
HOROWITZ INDEX BLD+IHG-RTO: 21 %
HOROWITZ INDEX BLD+IHG-RTO: 28 %
HYPOCHROMIA BLD QL: NORMAL
IMM GRANULOCYTES # BLD: 0.01 10*3/MM3 (ref 0–0.03)
IMM GRANULOCYTES # BLD: 0.02 10*3/MM3 (ref 0–0.03)
IMM GRANULOCYTES # BLD: 0.03 10*3/MM3 (ref 0–0.03)
IMM GRANULOCYTES # BLD: 0.04 10*3/MM3 (ref 0–0.03)
IMM GRANULOCYTES # BLD: 0.05 10*3/MM3 (ref 0–0.03)
IMM GRANULOCYTES # BLD: 0.05 10*3/MM3 (ref 0–0.03)
IMM GRANULOCYTES # BLD: 0.06 10*3/MM3 (ref 0–0.03)
IMM GRANULOCYTES # BLD: 0.06 10*3/MM3 (ref 0–0.03)
IMM GRANULOCYTES # BLD: 0.09 10*3/MM3 (ref 0–0.03)
IMM GRANULOCYTES # BLD: 0.1 10*3/MM3 (ref 0–0.03)
IMM GRANULOCYTES NFR BLD: 0.1 % (ref 0–0.5)
IMM GRANULOCYTES NFR BLD: 0.2 % (ref 0–0.5)
IMM GRANULOCYTES NFR BLD: 0.3 % (ref 0–0.5)
IMM GRANULOCYTES NFR BLD: 0.3 % (ref 0–0.6)
IMM GRANULOCYTES NFR BLD: 0.4 % (ref 0–0.5)
IMM GRANULOCYTES NFR BLD: 0.4 % (ref 0–0.5)
IMM GRANULOCYTES NFR BLD: 0.5 % (ref 0–0.5)
IMM GRANULOCYTES NFR BLD: 0.5 % (ref 0–0.6)
IMM GRANULOCYTES NFR BLD: 0.8 % (ref 0–0.6)
INR PPP: 0.93 (ref 0.8–1.1)
INR PPP: 0.93 (ref 0.8–1.1)
IRON 24H UR-MRATE: 24 MCG/DL (ref 53–167)
IRON 24H UR-MRATE: 24 MCG/DL (ref 53–167)
IRON SATN MFR SERPL: 10 % (ref 20–50)
IRON SATN MFR SERPL: 12 % (ref 20–50)
L PNEUMO1 AG UR QL IA: NEGATIVE
LAB AP CASE REPORT: NORMAL
LAB AP CASE REPORT: NORMAL
LDH FLD-CCNC: 274 IU/L
LDH SERPL-CCNC: 186 U/L (ref 135–225)
LYMPHOCYTES # BLD AUTO: 1.43 10*3/MM3 (ref 1–3)
LYMPHOCYTES # BLD AUTO: 1.63 10*3/MM3 (ref 1–3)
LYMPHOCYTES # BLD AUTO: 1.84 10*3/MM3 (ref 1–3)
LYMPHOCYTES # BLD AUTO: 1.84 10*3/MM3 (ref 1–3)
LYMPHOCYTES # BLD AUTO: 1.86 10*3/MM3 (ref 1–3)
LYMPHOCYTES # BLD AUTO: 1.99 10*3/MM3 (ref 1–3)
LYMPHOCYTES # BLD AUTO: 2.03 10*3/MM3 (ref 1–3)
LYMPHOCYTES # BLD AUTO: 2.21 10*3/MM3 (ref 1–3)
LYMPHOCYTES # BLD AUTO: 2.26 10*3/MM3 (ref 1–3)
LYMPHOCYTES # BLD AUTO: 2.27 10*3/MM3 (ref 1–3)
LYMPHOCYTES # BLD AUTO: 2.36 10*3/MM3 (ref 1–3)
LYMPHOCYTES # BLD AUTO: 2.37 10*3/MM3 (ref 1–3)
LYMPHOCYTES # BLD AUTO: 2.48 10*3/MM3 (ref 1–3)
LYMPHOCYTES # BLD AUTO: 2.65 10*3/MM3 (ref 1–3)
LYMPHOCYTES # BLD AUTO: 2.81 10*3/MM3 (ref 1–3)
LYMPHOCYTES # BLD AUTO: 2.96 10*3/MM3 (ref 1–3)
LYMPHOCYTES # BLD AUTO: 3.01 10*3/MM3 (ref 1–3)
LYMPHOCYTES # BLD AUTO: 3.06 10*3/MM3 (ref 0.6–4.8)
LYMPHOCYTES # BLD AUTO: 3.13 10*3/MM3 (ref 1–3)
LYMPHOCYTES # BLD AUTO: 3.24 10*3/MM3 (ref 0.6–4.8)
LYMPHOCYTES # BLD AUTO: 3.44 10*3/MM3 (ref 1–3)
LYMPHOCYTES # BLD AUTO: 3.45 10*3/MM3 (ref 0.6–4.8)
LYMPHOCYTES NFR BLD AUTO: 10.4 % (ref 16–46)
LYMPHOCYTES NFR BLD AUTO: 12.7 % (ref 16–46)
LYMPHOCYTES NFR BLD AUTO: 14.6 % (ref 16–46)
LYMPHOCYTES NFR BLD AUTO: 15.5 % (ref 16–46)
LYMPHOCYTES NFR BLD AUTO: 15.8 % (ref 16–46)
LYMPHOCYTES NFR BLD AUTO: 15.8 % (ref 16–46)
LYMPHOCYTES NFR BLD AUTO: 17 % (ref 16–46)
LYMPHOCYTES NFR BLD AUTO: 17.5 % (ref 16–46)
LYMPHOCYTES NFR BLD AUTO: 19.8 % (ref 16–46)
LYMPHOCYTES NFR BLD AUTO: 20.4 % (ref 16–46)
LYMPHOCYTES NFR BLD AUTO: 21.6 % (ref 16–46)
LYMPHOCYTES NFR BLD AUTO: 22.4 % (ref 16–46)
LYMPHOCYTES NFR BLD AUTO: 22.4 % (ref 16–46)
LYMPHOCYTES NFR BLD AUTO: 23 % (ref 16–46)
LYMPHOCYTES NFR BLD AUTO: 24.4 % (ref 16–46)
LYMPHOCYTES NFR BLD AUTO: 25.2 % (ref 16–46)
LYMPHOCYTES NFR BLD AUTO: 26.5 % (ref 16–46)
LYMPHOCYTES NFR BLD AUTO: 27.5 % (ref 24–44)
LYMPHOCYTES NFR BLD AUTO: 27.8 % (ref 24–44)
LYMPHOCYTES NFR BLD AUTO: 28 % (ref 16–46)
LYMPHOCYTES NFR BLD AUTO: 28.6 % (ref 16–46)
LYMPHOCYTES NFR BLD AUTO: 30.8 % (ref 24–44)
LYMPHOCYTES NFR FLD MANUAL: 57 %
Lab: NORMAL
Lab: NORMAL
M PNEUMO IGM SER QL: NEGATIVE
MAGNESIUM SERPL-MCNC: 1.9 MG/DL (ref 1.7–2.6)
MAGNESIUM SERPL-MCNC: 2 MG/DL (ref 1.7–2.6)
MCH RBC QN AUTO: 22.5 PG (ref 27–33)
MCH RBC QN AUTO: 22.6 PG (ref 27–33)
MCH RBC QN AUTO: 22.8 PG (ref 27–33)
MCH RBC QN AUTO: 22.8 PG (ref 27–33)
MCH RBC QN AUTO: 23.8 PG (ref 27–33)
MCH RBC QN AUTO: 24.9 PG (ref 27–33)
MCH RBC QN AUTO: 25.8 PG (ref 27–33)
MCH RBC QN AUTO: 26.2 PG (ref 27–33)
MCH RBC QN AUTO: 26.2 PG (ref 27–33)
MCH RBC QN AUTO: 26.3 PG (ref 27–31)
MCH RBC QN AUTO: 26.3 PG (ref 27–33)
MCH RBC QN AUTO: 26.4 PG (ref 27–33)
MCH RBC QN AUTO: 26.6 PG (ref 27–33)
MCH RBC QN AUTO: 26.6 PG (ref 27–33)
MCH RBC QN AUTO: 26.8 PG (ref 27–33)
MCH RBC QN AUTO: 26.9 PG (ref 27–31)
MCH RBC QN AUTO: 27 PG (ref 27–33)
MCH RBC QN AUTO: 27 PG (ref 27–33)
MCH RBC QN AUTO: 27.1 PG (ref 27–31)
MCH RBC QN AUTO: 27.3 PG (ref 27–33)
MCHC RBC AUTO-ENTMCNC: 30.1 G/DL (ref 32–36)
MCHC RBC AUTO-ENTMCNC: 30.5 G/DL (ref 33–37)
MCHC RBC AUTO-ENTMCNC: 30.5 G/DL (ref 33–37)
MCHC RBC AUTO-ENTMCNC: 30.9 G/DL (ref 33–37)
MCHC RBC AUTO-ENTMCNC: 31 G/DL (ref 33–37)
MCHC RBC AUTO-ENTMCNC: 31.2 G/DL (ref 32–36)
MCHC RBC AUTO-ENTMCNC: 31.3 G/DL (ref 33–37)
MCHC RBC AUTO-ENTMCNC: 31.3 G/DL (ref 33–37)
MCHC RBC AUTO-ENTMCNC: 31.4 G/DL (ref 33–37)
MCHC RBC AUTO-ENTMCNC: 31.4 G/DL (ref 33–37)
MCHC RBC AUTO-ENTMCNC: 31.5 G/DL (ref 32–36)
MCHC RBC AUTO-ENTMCNC: 31.5 G/DL (ref 33–37)
MCHC RBC AUTO-ENTMCNC: 31.5 G/DL (ref 33–37)
MCHC RBC AUTO-ENTMCNC: 31.7 G/DL (ref 33–37)
MCHC RBC AUTO-ENTMCNC: 31.8 G/DL (ref 33–37)
MCHC RBC AUTO-ENTMCNC: 31.9 G/DL (ref 33–37)
MCHC RBC AUTO-ENTMCNC: 32.1 G/DL (ref 33–37)
MCHC RBC AUTO-ENTMCNC: 32.2 G/DL (ref 33–37)
MCHC RBC AUTO-ENTMCNC: 32.6 G/DL (ref 33–37)
MCV RBC AUTO: 72.6 FL (ref 80–94)
MCV RBC AUTO: 72.7 FL (ref 80–94)
MCV RBC AUTO: 72.8 FL (ref 80–94)
MCV RBC AUTO: 73.9 FL (ref 80–94)
MCV RBC AUTO: 74 FL (ref 80–94)
MCV RBC AUTO: 81.5 FL (ref 80–94)
MCV RBC AUTO: 83.1 FL (ref 80–94)
MCV RBC AUTO: 83.3 FL (ref 80–94)
MCV RBC AUTO: 83.4 FL (ref 80–94)
MCV RBC AUTO: 83.5 FL (ref 80–94)
MCV RBC AUTO: 83.7 FL (ref 80–94)
MCV RBC AUTO: 83.7 FL (ref 80–94)
MCV RBC AUTO: 84.4 FL (ref 80–94)
MCV RBC AUTO: 84.8 FL (ref 80–94)
MCV RBC AUTO: 84.8 FL (ref 80–94)
MCV RBC AUTO: 84.9 FL (ref 80–94)
MCV RBC AUTO: 84.9 FL (ref 80–94)
MCV RBC AUTO: 85 FL (ref 80–94)
MCV RBC AUTO: 85 FL (ref 80–94)
MCV RBC AUTO: 86.1 FL (ref 80–99)
MCV RBC AUTO: 86.3 FL (ref 80–99)
MCV RBC AUTO: 87.2 FL (ref 80–99)
MDMA UR QL SCN: NEGATIVE
METHADONE UR QL SCN: NEGATIVE
METHGB BLD QL: 0.3 % (ref 0–3)
METHGB BLD QL: 0.4 % (ref 0–3)
METHOD: ABNORMAL
MICROCYTES BLD QL: NORMAL
MODALITY: ABNORMAL
MODALITY: ABNORMAL
MONOCYTES # BLD AUTO: 0.78 10*3/MM3 (ref 0.1–0.9)
MONOCYTES # BLD AUTO: 0.92 10*3/MM3 (ref 0.1–0.9)
MONOCYTES # BLD AUTO: 0.94 10*3/MM3 (ref 0.1–0.9)
MONOCYTES # BLD AUTO: 0.97 10*3/MM3 (ref 0.1–0.9)
MONOCYTES # BLD AUTO: 0.98 10*3/MM3 (ref 0.1–0.9)
MONOCYTES # BLD AUTO: 1.06 10*3/MM3 (ref 0.1–0.9)
MONOCYTES # BLD AUTO: 1.07 10*3/MM3 (ref 0–1)
MONOCYTES # BLD AUTO: 1.09 10*3/MM3 (ref 0.1–0.9)
MONOCYTES # BLD AUTO: 1.09 10*3/MM3 (ref 0–1)
MONOCYTES # BLD AUTO: 1.1 10*3/MM3 (ref 0.1–0.9)
MONOCYTES # BLD AUTO: 1.17 10*3/MM3 (ref 0–1)
MONOCYTES # BLD AUTO: 1.19 10*3/MM3 (ref 0.1–0.9)
MONOCYTES # BLD AUTO: 1.2 10*3/MM3 (ref 0.1–0.9)
MONOCYTES # BLD AUTO: 1.2 10*3/MM3 (ref 0.1–0.9)
MONOCYTES # BLD AUTO: 1.23 10*3/MM3 (ref 0.1–0.9)
MONOCYTES # BLD AUTO: 1.25 10*3/MM3 (ref 0.1–0.9)
MONOCYTES # BLD AUTO: 1.33 10*3/MM3 (ref 0.1–0.9)
MONOCYTES # BLD AUTO: 1.34 10*3/MM3 (ref 0.1–0.9)
MONOCYTES # BLD AUTO: 1.45 10*3/MM3 (ref 0.1–0.9)
MONOCYTES # BLD AUTO: 1.5 10*3/MM3 (ref 0.1–0.9)
MONOCYTES # BLD AUTO: 1.57 10*3/MM3 (ref 0.1–0.9)
MONOCYTES # BLD AUTO: 1.83 10*3/MM3 (ref 0.1–0.9)
MONOCYTES NFR BLD AUTO: 10.1 % (ref 0–12)
MONOCYTES NFR BLD AUTO: 10.3 % (ref 0–12)
MONOCYTES NFR BLD AUTO: 10.5 % (ref 0–12)
MONOCYTES NFR BLD AUTO: 11 % (ref 0–12)
MONOCYTES NFR BLD AUTO: 11.3 % (ref 0–12)
MONOCYTES NFR BLD AUTO: 11.5 % (ref 0–12)
MONOCYTES NFR BLD AUTO: 11.8 % (ref 0–12)
MONOCYTES NFR BLD AUTO: 11.8 % (ref 0–12)
MONOCYTES NFR BLD AUTO: 12.2 % (ref 0–12)
MONOCYTES NFR BLD AUTO: 12.7 % (ref 0–12)
MONOCYTES NFR BLD AUTO: 13.2 % (ref 0–12)
MONOCYTES NFR BLD AUTO: 15.9 % (ref 0–12)
MONOCYTES NFR BLD AUTO: 4.6 % (ref 0–12)
MONOCYTES NFR BLD AUTO: 6.3 % (ref 0–12)
MONOCYTES NFR BLD AUTO: 8.6 % (ref 0–12)
MONOCYTES NFR BLD AUTO: 8.7 % (ref 0–12)
MONOCYTES NFR BLD AUTO: 8.8 % (ref 0–12)
MONOCYTES NFR BLD AUTO: 9 % (ref 0–12)
MONOCYTES NFR BLD AUTO: 9.4 % (ref 0–12)
MONOCYTES NFR BLD AUTO: 9.4 % (ref 0–12)
MONOCYTES NFR BLD AUTO: 9.5 % (ref 0–12)
MONOCYTES NFR BLD AUTO: 9.6 % (ref 0–12)
MONOS+MACROS NFR FLD: 36 %
MYOGLOBIN SERPL-MCNC: 61 NG/ML (ref 0–109)
MYOGLOBIN SERPL-MCNC: 73 NG/ML (ref 0–109)
MYOGLOBIN SERPL-MCNC: 98 NG/ML (ref 0–109)
NEUTROPHILS # BLD AUTO: 10.24 10*3/MM3 (ref 1.4–6.5)
NEUTROPHILS # BLD AUTO: 14.17 10*3/MM3 (ref 1.4–6.5)
NEUTROPHILS # BLD AUTO: 14.85 10*3/MM3 (ref 1.4–6.5)
NEUTROPHILS # BLD AUTO: 19.34 10*3/MM3 (ref 1.4–6.5)
NEUTROPHILS # BLD AUTO: 5.11 10*3/MM3 (ref 1.4–6.5)
NEUTROPHILS # BLD AUTO: 5.31 10*3/MM3 (ref 1.4–6.5)
NEUTROPHILS # BLD AUTO: 5.48 10*3/MM3 (ref 1.4–6.5)
NEUTROPHILS # BLD AUTO: 5.57 10*3/MM3 (ref 1.4–6.5)
NEUTROPHILS # BLD AUTO: 5.74 10*3/MM3 (ref 1.4–6.5)
NEUTROPHILS # BLD AUTO: 5.95 10*3/MM3 (ref 1.5–8.3)
NEUTROPHILS # BLD AUTO: 5.98 10*3/MM3 (ref 1.4–6.5)
NEUTROPHILS # BLD AUTO: 6.42 10*3/MM3 (ref 1.4–6.5)
NEUTROPHILS # BLD AUTO: 6.62 10*3/MM3 (ref 1.5–8.3)
NEUTROPHILS # BLD AUTO: 6.73 10*3/MM3 (ref 1.4–6.5)
NEUTROPHILS # BLD AUTO: 6.85 10*3/MM3 (ref 1.4–6.5)
NEUTROPHILS # BLD AUTO: 6.88 10*3/MM3 (ref 1.5–8.3)
NEUTROPHILS # BLD AUTO: 7.02 10*3/MM3 (ref 1.4–6.5)
NEUTROPHILS # BLD AUTO: 7.57 10*3/MM3 (ref 1.4–6.5)
NEUTROPHILS # BLD AUTO: 7.89 10*3/MM3 (ref 1.4–6.5)
NEUTROPHILS # BLD AUTO: 8 10*3/MM3 (ref 1.4–6.5)
NEUTROPHILS # BLD AUTO: 8.99 10*3/MM3 (ref 1.4–6.5)
NEUTROPHILS # BLD AUTO: 9.02 10*3/MM3 (ref 1.4–6.5)
NEUTROPHILS NFR BLD AUTO: 53.1 % (ref 41–71)
NEUTROPHILS NFR BLD AUTO: 55.4 % (ref 40–75)
NEUTROPHILS NFR BLD AUTO: 56.9 % (ref 40–75)
NEUTROPHILS NFR BLD AUTO: 58.4 % (ref 40–75)
NEUTROPHILS NFR BLD AUTO: 59.1 % (ref 41–71)
NEUTROPHILS NFR BLD AUTO: 59.6 % (ref 41–71)
NEUTROPHILS NFR BLD AUTO: 59.8 % (ref 40–75)
NEUTROPHILS NFR BLD AUTO: 61.8 % (ref 40–75)
NEUTROPHILS NFR BLD AUTO: 63.6 % (ref 40–75)
NEUTROPHILS NFR BLD AUTO: 64 % (ref 40–75)
NEUTROPHILS NFR BLD AUTO: 64 % (ref 40–75)
NEUTROPHILS NFR BLD AUTO: 64.6 % (ref 40–75)
NEUTROPHILS NFR BLD AUTO: 65.2 % (ref 40–75)
NEUTROPHILS NFR BLD AUTO: 66.7 % (ref 40–75)
NEUTROPHILS NFR BLD AUTO: 68.8 % (ref 40–75)
NEUTROPHILS NFR BLD AUTO: 69.1 % (ref 40–75)
NEUTROPHILS NFR BLD AUTO: 69.9 % (ref 40–75)
NEUTROPHILS NFR BLD AUTO: 71.1 % (ref 40–75)
NEUTROPHILS NFR BLD AUTO: 72.5 % (ref 40–75)
NEUTROPHILS NFR BLD AUTO: 74.7 % (ref 40–75)
NEUTROPHILS NFR BLD AUTO: 77.7 % (ref 40–75)
NEUTROPHILS NFR BLD AUTO: 84.8 % (ref 40–75)
NEUTROPHILS NFR FLD MANUAL: 7 %
NRBC BLD MANUAL-RTO: 0 /100 WBC (ref 0–0)
NUC CELL # FLD: 1232 /MM3
OPIATES UR QL: NEGATIVE
OSMOLALITY SERPL CALC.SUM OF ELEC: 260.8 MOSM/KG (ref 273–305)
OSMOLALITY SERPL CALC.SUM OF ELEC: 264.9 MOSM/KG (ref 273–305)
OSMOLALITY SERPL CALC.SUM OF ELEC: 265.4 MOSM/KG (ref 273–305)
OSMOLALITY SERPL CALC.SUM OF ELEC: 266.6 MOSM/KG (ref 273–305)
OSMOLALITY SERPL CALC.SUM OF ELEC: 266.6 MOSM/KG (ref 273–305)
OSMOLALITY SERPL CALC.SUM OF ELEC: 267.5 MOSM/KG (ref 273–305)
OSMOLALITY SERPL CALC.SUM OF ELEC: 267.9 MOSM/KG (ref 273–305)
OSMOLALITY SERPL CALC.SUM OF ELEC: 269.1 MOSM/KG (ref 273–305)
OSMOLALITY SERPL CALC.SUM OF ELEC: 270.1 MOSM/KG (ref 273–305)
OSMOLALITY SERPL CALC.SUM OF ELEC: 270.2 MOSM/KG (ref 273–305)
OSMOLALITY SERPL CALC.SUM OF ELEC: 270.8 MOSM/KG (ref 273–305)
OSMOLALITY SERPL CALC.SUM OF ELEC: 273.5 MOSM/KG (ref 273–305)
OSMOLALITY SERPL CALC.SUM OF ELEC: 273.7 MOSM/KG (ref 273–305)
OSMOLALITY SERPL CALC.SUM OF ELEC: 275.6 MOSM/KG (ref 273–305)
OSMOLALITY SERPL CALC.SUM OF ELEC: 277.8 MOSM/KG (ref 273–305)
OSMOLALITY SERPL CALC.SUM OF ELEC: 278.5 MOSM/KG (ref 273–305)
OSMOLALITY SERPL CALC.SUM OF ELEC: 279.2 MOSM/KG (ref 273–305)
OSMOLALITY SERPL CALC.SUM OF ELEC: 286.9 MOSM/KG (ref 273–305)
OXYCODONE UR QL SCN: NEGATIVE
OXYHGB MFR BLDV: 93.7 % (ref 85–100)
OXYHGB MFR BLDV: 95.1 % (ref 85–100)
PCO2 BLDA: 35.5 MM HG (ref 35–45)
PCO2 BLDA: 37.4 MM HG (ref 35–45)
PCP UR QL SCN: NEGATIVE
PH BLDA: 7.43 PH UNITS (ref 7.35–7.45)
PH BLDA: 7.47 PH UNITS (ref 7.35–7.45)
PHOSPHATE SERPL-MCNC: 2.2 MG/DL (ref 2.7–4.5)
PHOSPHATE SERPL-MCNC: 2.3 MG/DL (ref 2.7–4.5)
PHOSPHATE SERPL-MCNC: 2.3 MG/DL (ref 2.7–4.5)
PHOSPHATE SERPL-MCNC: 2.7 MG/DL (ref 2.7–4.5)
PHOSPHATE SERPL-MCNC: 3.5 MG/DL (ref 2.7–4.5)
PLAT MORPH BLD: NORMAL
PLATELET # BLD AUTO: 334 10*3/MM3 (ref 130–400)
PLATELET # BLD AUTO: 374 10*3/MM3 (ref 130–400)
PLATELET # BLD AUTO: 377 10*3/MM3 (ref 130–400)
PLATELET # BLD AUTO: 378 10*3/MM3 (ref 130–400)
PLATELET # BLD AUTO: 398 10*3/MM3 (ref 130–400)
PLATELET # BLD AUTO: 409 10*3/MM3 (ref 130–400)
PLATELET # BLD AUTO: 413 10*3/MM3 (ref 150–450)
PLATELET # BLD AUTO: 415 10*3/MM3 (ref 130–400)
PLATELET # BLD AUTO: 426 10*3/MM3 (ref 130–400)
PLATELET # BLD AUTO: 435 10*3/MM3 (ref 130–400)
PLATELET # BLD AUTO: 442 10*3/MM3 (ref 130–400)
PLATELET # BLD AUTO: 443 10*3/MM3 (ref 130–400)
PLATELET # BLD AUTO: 447 10*3/MM3 (ref 130–400)
PLATELET # BLD AUTO: 451 10*3/MM3 (ref 130–400)
PLATELET # BLD AUTO: 453 10*3/MM3 (ref 150–450)
PLATELET # BLD AUTO: 457 10*3/MM3 (ref 150–450)
PLATELET # BLD AUTO: 475 10*3/MM3 (ref 130–400)
PLATELET # BLD AUTO: 483 10*3/MM3 (ref 130–400)
PLATELET # BLD AUTO: 524 10*3/MM3 (ref 130–400)
PLATELET # BLD AUTO: 557 10*3/MM3 (ref 130–400)
PLATELET # BLD AUTO: 575 10*3/MM3 (ref 130–400)
PLATELET # BLD AUTO: 608 10*3/MM3 (ref 130–400)
PMV BLD AUTO: 8.8 FL (ref 6–10)
PMV BLD AUTO: 8.8 FL (ref 6–10)
PMV BLD AUTO: 8.8 FL (ref 6–12)
PMV BLD AUTO: 8.9 FL (ref 6–10)
PMV BLD AUTO: 8.9 FL (ref 6–12)
PMV BLD AUTO: 9.1 FL (ref 6–12)
PMV BLD AUTO: 9.2 FL (ref 6–10)
PMV BLD AUTO: 9.2 FL (ref 6–10)
PMV BLD AUTO: 9.3 FL (ref 6–10)
PMV BLD AUTO: 9.4 FL (ref 6–10)
PMV BLD AUTO: 9.5 FL (ref 6–10)
PMV BLD AUTO: 9.5 FL (ref 6–10)
PMV BLD AUTO: 9.6 FL (ref 6–10)
PMV BLD AUTO: 9.8 FL (ref 6–10)
PMV BLD AUTO: 9.8 FL (ref 6–10)
PMV BLD AUTO: 9.9 FL (ref 6–10)
PO2 BLDA: 73.8 MM HG (ref 80–100)
PO2 BLDA: 79.8 MM HG (ref 80–100)
POTASSIUM BLD-SCNC: 3.7 MMOL/L (ref 3.5–5.5)
POTASSIUM BLD-SCNC: 3.7 MMOL/L (ref 3.5–5.5)
POTASSIUM BLD-SCNC: 4 MMOL/L (ref 3.5–5.5)
POTASSIUM BLD-SCNC: 4.1 MMOL/L (ref 3.5–5.3)
POTASSIUM BLD-SCNC: 4.2 MMOL/L (ref 3.5–5.3)
POTASSIUM BLD-SCNC: 4.2 MMOL/L (ref 3.5–5.3)
POTASSIUM BLD-SCNC: 4.3 MMOL/L (ref 3.5–5.3)
POTASSIUM BLD-SCNC: 4.5 MMOL/L (ref 3.5–5.3)
POTASSIUM BLD-SCNC: 4.7 MMOL/L (ref 3.5–5.3)
POTASSIUM BLD-SCNC: 4.8 MMOL/L (ref 3.5–5.3)
POTASSIUM BLD-SCNC: 4.9 MMOL/L (ref 3.5–5.3)
POTASSIUM BLD-SCNC: 4.9 MMOL/L (ref 3.5–5.3)
POTASSIUM BLD-SCNC: 5.1 MMOL/L (ref 3.5–5.3)
POTASSIUM BLD-SCNC: 5.4 MMOL/L (ref 3.5–5.3)
PROT FLD-MCNC: 4.4 G/DL
PROT SERPL-MCNC: 4.8 G/DL (ref 5.7–8.2)
PROT SERPL-MCNC: 5 G/DL (ref 6–8)
PROT SERPL-MCNC: 5 G/DL (ref 6–8)
PROT SERPL-MCNC: 5.1 G/DL (ref 6–8)
PROT SERPL-MCNC: 5.3 G/DL (ref 6–8)
PROT SERPL-MCNC: 5.3 G/DL (ref 6–8)
PROT SERPL-MCNC: 5.4 G/DL (ref 6–8)
PROT SERPL-MCNC: 5.7 G/DL (ref 6–8)
PROT SERPL-MCNC: 5.8 G/DL (ref 6–8)
PROT SERPL-MCNC: 5.8 G/DL (ref 6–8)
PROT SERPL-MCNC: 6.3 G/DL (ref 6–8)
PROT SERPL-MCNC: 6.5 G/DL (ref 6–8)
PROT SERPL-MCNC: 6.5 G/DL (ref 6–8)
PROT SERPL-MCNC: 6.8 G/DL (ref 6–8)
PROT SERPL-MCNC: 7 G/DL (ref 6–8)
PROT SERPL-MCNC: 7.2 G/DL (ref 6–8)
PROT SERPL-MCNC: 7.3 G/DL (ref 6–8)
PROTHROMBIN TIME: 10.3 SECONDS (ref 9.8–11.9)
PROTHROMBIN TIME: 10.3 SECONDS (ref 9.8–11.9)
PSA SERPL-MCNC: 16.32 NG/ML (ref 0–4)
PSA SERPL-MCNC: 2.04 NG/ML (ref 0–4)
PSA SERPL-MCNC: 26.51 NG/ML (ref 0–4)
PSA SERPL-MCNC: 3.66 NG/ML (ref 0–4)
PSA SERPL-MCNC: 4.72 NG/ML (ref 0–4)
PSA SERPL-MCNC: 6.91 NG/ML (ref 0–4)
RBC # BLD AUTO: 2.93 10*6/MM3 (ref 4.7–6.1)
RBC # BLD AUTO: 2.95 10*6/MM3 (ref 4.2–5.76)
RBC # BLD AUTO: 2.95 10*6/MM3 (ref 4.7–6.1)
RBC # BLD AUTO: 3.12 10*6/MM3 (ref 4.2–5.76)
RBC # BLD AUTO: 3.12 10*6/MM3 (ref 4.7–6.1)
RBC # BLD AUTO: 3.15 10*6/MM3 (ref 4.7–6.1)
RBC # BLD AUTO: 3.17 10*6/MM3 (ref 4.7–6.1)
RBC # BLD AUTO: 3.19 10*6/MM3 (ref 4.7–6.1)
RBC # BLD AUTO: 3.2 10*6/MM3 (ref 4.2–5.76)
RBC # BLD AUTO: 3.35 10*6/MM3 (ref 4.7–6.1)
RBC # BLD AUTO: 3.37 10*6/MM3 (ref 4.7–6.1)
RBC # BLD AUTO: 3.96 10*6/MM3 (ref 4.7–6.1)
RBC # BLD AUTO: 4.1 10*6/MM3 (ref 4.7–6.1)
RBC # BLD AUTO: 4.48 10*6/MM3 (ref 4.7–6.1)
RBC # BLD AUTO: 4.55 10*6/MM3 (ref 4.7–6.1)
RBC # BLD AUTO: 4.67 10*6/MM3 (ref 4.7–6.1)
RBC # BLD AUTO: 4.69 10*6/MM3 (ref 4.7–6.1)
RBC # BLD AUTO: 4.7 10*6/MM3 (ref 4.7–6.1)
RBC # BLD AUTO: 4.74 10*6/MM3 (ref 4.7–6.1)
RBC # BLD AUTO: 4.79 10*6/MM3 (ref 4.7–6.1)
RBC # BLD AUTO: 4.81 10*6/MM3 (ref 4.7–6.1)
RBC # BLD AUTO: 4.82 10*6/MM3 (ref 4.7–6.1)
RBC # FLD AUTO: ABNORMAL /MM3
SAO2 % BLDCOA: 95.3 % (ref 90–100)
SAO2 % BLDCOA: 96.5 % (ref 90–100)
SMALL PLATELETS BLD QL SMEAR: NORMAL
SODIUM BLD-SCNC: 127 MMOL/L (ref 135–153)
SODIUM BLD-SCNC: 130 MMOL/L (ref 135–153)
SODIUM BLD-SCNC: 130 MMOL/L (ref 135–153)
SODIUM BLD-SCNC: 131 MMOL/L (ref 135–153)
SODIUM BLD-SCNC: 131 MMOL/L (ref 135–153)
SODIUM BLD-SCNC: 132 MMOL/L (ref 135–153)
SODIUM BLD-SCNC: 133 MMOL/L (ref 135–153)
SODIUM BLD-SCNC: 133 MMOL/L (ref 135–153)
SODIUM BLD-SCNC: 135 MMOL/L (ref 135–153)
SODIUM BLD-SCNC: 136 MMOL/L (ref 132–146)
SODIUM BLD-SCNC: 136 MMOL/L (ref 135–153)
SODIUM BLD-SCNC: 137 MMOL/L (ref 135–153)
SODIUM BLD-SCNC: 138 MMOL/L (ref 132–146)
SODIUM BLD-SCNC: 139 MMOL/L (ref 132–146)
SODIUM BLD-SCNC: 142 MMOL/L (ref 135–153)
SPECIMEN PREPARATION: NORMAL
TIBC SERPL-MCNC: 207 MCG/DL (ref 241–421)
TIBC SERPL-MCNC: 238 MCG/DL (ref 241–421)
TRIGL SERPL-MCNC: 106 MG/DL (ref 0–150)
TROPONIN I SERPL-MCNC: <0.006 NG/ML
TSH SERPL DL<=0.05 MIU/L-ACNC: 1.81 MIU/ML (ref 0.55–4.78)
VANCOMYCIN TROUGH SERPL-MCNC: 14.2 MCG/ML (ref 10–20)
VANCOMYCIN TROUGH SERPL-MCNC: 5.9 MCG/ML (ref 10–20)
WBC NRBC COR # BLD: 10.05 10*3/MM3 (ref 4.5–12.5)
WBC NRBC COR # BLD: 10.26 10*3/MM3 (ref 4.5–12.5)
WBC NRBC COR # BLD: 10.52 10*3/MM3 (ref 4.5–12.5)
WBC NRBC COR # BLD: 10.82 10*3/MM3 (ref 4.5–12.5)
WBC NRBC COR # BLD: 10.85 10*3/MM3 (ref 4.5–12.5)
WBC NRBC COR # BLD: 11.11 10*3/MM3 (ref 3.5–10.8)
WBC NRBC COR # BLD: 11.19 10*3/MM3 (ref 3.5–10.8)
WBC NRBC COR # BLD: 11.64 10*3/MM3 (ref 3.5–10.8)
WBC NRBC COR # BLD: 12.28 10*3/MM3 (ref 4.5–12.5)
WBC NRBC COR # BLD: 12.41 10*3/MM3 (ref 4.5–12.5)
WBC NRBC COR # BLD: 12.64 10*3/MM3 (ref 4.5–12.5)
WBC NRBC COR # BLD: 14.1 10*3/MM3 (ref 4.5–12.5)
WBC NRBC COR # BLD: 14.89 10*3/MM3 (ref 4.5–12.5)
WBC NRBC COR # BLD: 19.08 10*3/MM3 (ref 4.5–12.5)
WBC NRBC COR # BLD: 19.53 10*3/MM3 (ref 4.5–12.5)
WBC NRBC COR # BLD: 22.82 10*3/MM3 (ref 4.5–12.5)
WBC NRBC COR # BLD: 8.31 10*3/MM3 (ref 4.5–12.5)
WBC NRBC COR # BLD: 8.54 10*3/MM3 (ref 4.5–12.5)
WBC NRBC COR # BLD: 8.89 10*3/MM3 (ref 4.5–12.5)
WBC NRBC COR # BLD: 9.03 10*3/MM3 (ref 4.5–12.5)
WBC NRBC COR # BLD: 9.3 10*3/MM3 (ref 4.5–12.5)
WBC NRBC COR # BLD: 9.85 10*3/MM3 (ref 4.5–12.5)

## 2017-01-01 PROCEDURE — 99232 SBSQ HOSP IP/OBS MODERATE 35: CPT | Performed by: INTERNAL MEDICINE

## 2017-01-01 PROCEDURE — 25010000002 FENTANYL CITRATE (PF) 100 MCG/2ML SOLUTION: Performed by: NURSE ANESTHETIST, CERTIFIED REGISTERED

## 2017-01-01 PROCEDURE — 88112 CYTOPATH CELL ENHANCE TECH: CPT | Performed by: INTERNAL MEDICINE

## 2017-01-01 PROCEDURE — 82805 BLOOD GASES W/O2 SATURATION: CPT | Performed by: INTERNAL MEDICINE

## 2017-01-01 PROCEDURE — 82962 GLUCOSE BLOOD TEST: CPT

## 2017-01-01 PROCEDURE — 82553 CREATINE MB FRACTION: CPT | Performed by: INTERNAL MEDICINE

## 2017-01-01 PROCEDURE — 85025 COMPLETE CBC W/AUTO DIFF WBC: CPT | Performed by: INTERNAL MEDICINE

## 2017-01-01 PROCEDURE — 99223 1ST HOSP IP/OBS HIGH 75: CPT | Performed by: INTERNAL MEDICINE

## 2017-01-01 PROCEDURE — 87102 FUNGUS ISOLATION CULTURE: CPT | Performed by: INTERNAL MEDICINE

## 2017-01-01 PROCEDURE — 83036 HEMOGLOBIN GLYCOSYLATED A1C: CPT | Performed by: INTERNAL MEDICINE

## 2017-01-01 PROCEDURE — 99233 SBSQ HOSP IP/OBS HIGH 50: CPT | Performed by: INTERNAL MEDICINE

## 2017-01-01 PROCEDURE — 84153 ASSAY OF PSA TOTAL: CPT | Performed by: INTERNAL MEDICINE

## 2017-01-01 PROCEDURE — 71010 XR CHEST AP: CPT | Performed by: RADIOLOGY

## 2017-01-01 PROCEDURE — 97116 GAIT TRAINING THERAPY: CPT

## 2017-01-01 PROCEDURE — 84153 ASSAY OF PSA TOTAL: CPT

## 2017-01-01 PROCEDURE — 25010000002 HEPARIN (PORCINE) PER 1000 UNITS: Performed by: NURSE PRACTITIONER

## 2017-01-01 PROCEDURE — 36415 COLL VENOUS BLD VENIPUNCTURE: CPT

## 2017-01-01 PROCEDURE — 25010000002 PIPERACILLIN-TAZOBACTAM: Performed by: NURSE PRACTITIONER

## 2017-01-01 PROCEDURE — 25010000002 DENOSUMAB 120 MG/1.7ML SOLUTION: Performed by: INTERNAL MEDICINE

## 2017-01-01 PROCEDURE — 87070 CULTURE OTHR SPECIMN AEROBIC: CPT | Performed by: INTERNAL MEDICINE

## 2017-01-01 PROCEDURE — 96372 THER/PROPH/DIAG INJ SC/IM: CPT | Performed by: NURSE PRACTITIONER

## 2017-01-01 PROCEDURE — 86140 C-REACTIVE PROTEIN: CPT | Performed by: HOSPITALIST

## 2017-01-01 PROCEDURE — 88341 IMHCHEM/IMCYTCHM EA ADD ANTB: CPT | Performed by: INTERNAL MEDICINE

## 2017-01-01 PROCEDURE — 99231 SBSQ HOSP IP/OBS SF/LOW 25: CPT | Performed by: SURGERY

## 2017-01-01 PROCEDURE — 71250 CT THORAX DX C-: CPT

## 2017-01-01 PROCEDURE — 96372 THER/PROPH/DIAG INJ SC/IM: CPT

## 2017-01-01 PROCEDURE — 25010000002 PROPOFOL 10 MG/ML EMULSION: Performed by: NURSE ANESTHETIST, CERTIFIED REGISTERED

## 2017-01-01 PROCEDURE — 80053 COMPREHEN METABOLIC PANEL: CPT | Performed by: INTERNAL MEDICINE

## 2017-01-01 PROCEDURE — 80307 DRUG TEST PRSMV CHEM ANLYZR: CPT | Performed by: INTERNAL MEDICINE

## 2017-01-01 PROCEDURE — 99232 SBSQ HOSP IP/OBS MODERATE 35: CPT | Performed by: HOSPITALIST

## 2017-01-01 PROCEDURE — 80053 COMPREHEN METABOLIC PANEL: CPT | Performed by: HOSPITALIST

## 2017-01-01 PROCEDURE — 93010 ELECTROCARDIOGRAM REPORT: CPT | Performed by: INTERNAL MEDICINE

## 2017-01-01 PROCEDURE — 84484 ASSAY OF TROPONIN QUANT: CPT | Performed by: EMERGENCY MEDICINE

## 2017-01-01 PROCEDURE — 88305 TISSUE EXAM BY PATHOLOGIST: CPT | Performed by: INTERNAL MEDICINE

## 2017-01-01 PROCEDURE — 83880 ASSAY OF NATRIURETIC PEPTIDE: CPT | Performed by: HOSPITALIST

## 2017-01-01 PROCEDURE — 25010000002 HEPARIN (PORCINE) PER 1000 UNITS: Performed by: HOSPITALIST

## 2017-01-01 PROCEDURE — 86140 C-REACTIVE PROTEIN: CPT | Performed by: PHYSICIAN ASSISTANT

## 2017-01-01 PROCEDURE — 94799 UNLISTED PULMONARY SVC/PX: CPT

## 2017-01-01 PROCEDURE — 99214 OFFICE O/P EST MOD 30 MIN: CPT | Performed by: INTERNAL MEDICINE

## 2017-01-01 PROCEDURE — 74176 CT ABD & PELVIS W/O CONTRAST: CPT | Performed by: RADIOLOGY

## 2017-01-01 PROCEDURE — 99283 EMERGENCY DEPT VISIT LOW MDM: CPT

## 2017-01-01 PROCEDURE — 83050 HGB METHEMOGLOBIN QUAN: CPT | Performed by: EMERGENCY MEDICINE

## 2017-01-01 PROCEDURE — 78306 BONE IMAGING WHOLE BODY: CPT | Performed by: RADIOLOGY

## 2017-01-01 PROCEDURE — 82550 ASSAY OF CK (CPK): CPT | Performed by: INTERNAL MEDICINE

## 2017-01-01 PROCEDURE — 93306 TTE W/DOPPLER COMPLETE: CPT | Performed by: INTERNAL MEDICINE

## 2017-01-01 PROCEDURE — 25010000002 MIDAZOLAM PER 1 MG: Performed by: NURSE ANESTHETIST, CERTIFIED REGISTERED

## 2017-01-01 PROCEDURE — 80048 BASIC METABOLIC PNL TOTAL CA: CPT | Performed by: PHYSICIAN ASSISTANT

## 2017-01-01 PROCEDURE — 76942 ECHO GUIDE FOR BIOPSY: CPT

## 2017-01-01 PROCEDURE — G0378 HOSPITAL OBSERVATION PER HR: HCPCS

## 2017-01-01 PROCEDURE — 36600 WITHDRAWAL OF ARTERIAL BLOOD: CPT | Performed by: INTERNAL MEDICINE

## 2017-01-01 PROCEDURE — 71250 CT THORAX DX C-: CPT | Performed by: RADIOLOGY

## 2017-01-01 PROCEDURE — 80061 LIPID PANEL: CPT | Performed by: FAMILY MEDICINE

## 2017-01-01 PROCEDURE — 83615 LACTATE (LD) (LDH) ENZYME: CPT | Performed by: INTERNAL MEDICINE

## 2017-01-01 PROCEDURE — 25010000002 VANCOMYCIN HCL IN NACL 1.25-0.9 GM/250ML-% SOLUTION

## 2017-01-01 PROCEDURE — 82375 ASSAY CARBOXYHB QUANT: CPT | Performed by: EMERGENCY MEDICINE

## 2017-01-01 PROCEDURE — 86140 C-REACTIVE PROTEIN: CPT | Performed by: INTERNAL MEDICINE

## 2017-01-01 PROCEDURE — 71010 HC CHEST PA OR AP: CPT

## 2017-01-01 PROCEDURE — 74176 CT ABD & PELVIS W/O CONTRAST: CPT

## 2017-01-01 PROCEDURE — 96401 CHEMO ANTI-NEOPL SQ/IM: CPT

## 2017-01-01 PROCEDURE — 83880 ASSAY OF NATRIURETIC PEPTIDE: CPT | Performed by: EMERGENCY MEDICINE

## 2017-01-01 PROCEDURE — 99231 SBSQ HOSP IP/OBS SF/LOW 25: CPT | Performed by: INTERNAL MEDICINE

## 2017-01-01 PROCEDURE — 83036 HEMOGLOBIN GLYCOSYLATED A1C: CPT | Performed by: FAMILY MEDICINE

## 2017-01-01 PROCEDURE — 71010 XR CHEST 1 VW: CPT | Performed by: RADIOLOGY

## 2017-01-01 PROCEDURE — 94640 AIRWAY INHALATION TREATMENT: CPT

## 2017-01-01 PROCEDURE — 85007 BL SMEAR W/DIFF WBC COUNT: CPT | Performed by: INTERNAL MEDICINE

## 2017-01-01 PROCEDURE — 87206 SMEAR FLUORESCENT/ACID STAI: CPT | Performed by: INTERNAL MEDICINE

## 2017-01-01 PROCEDURE — 97165 OT EVAL LOW COMPLEX 30 MIN: CPT

## 2017-01-01 PROCEDURE — 32550 INSERT PLEURAL CATH: CPT | Performed by: SURGERY

## 2017-01-01 PROCEDURE — 99284 EMERGENCY DEPT VISIT MOD MDM: CPT

## 2017-01-01 PROCEDURE — 25010000002 PROPOFOL 1000 MG/ML EMULSION: Performed by: NURSE ANESTHETIST, CERTIFIED REGISTERED

## 2017-01-01 PROCEDURE — 25010000002 LEUPROLIDE ACETATE (3 MONTH) PER 7.5 MG: Performed by: INTERNAL MEDICINE

## 2017-01-01 PROCEDURE — 83735 ASSAY OF MAGNESIUM: CPT

## 2017-01-01 PROCEDURE — 84157 ASSAY OF PROTEIN OTHER: CPT | Performed by: INTERNAL MEDICINE

## 2017-01-01 PROCEDURE — 84484 ASSAY OF TROPONIN QUANT: CPT | Performed by: INTERNAL MEDICINE

## 2017-01-01 PROCEDURE — 84100 ASSAY OF PHOSPHORUS: CPT

## 2017-01-01 PROCEDURE — 87205 SMEAR GRAM STAIN: CPT | Performed by: INTERNAL MEDICINE

## 2017-01-01 PROCEDURE — 82945 GLUCOSE OTHER FLUID: CPT | Performed by: INTERNAL MEDICINE

## 2017-01-01 PROCEDURE — 32555 ASPIRATE PLEURA W/ IMAGING: CPT | Performed by: RADIOLOGY

## 2017-01-01 PROCEDURE — 32552 REMOVE LUNG CATHETER: CPT | Performed by: SURGERY

## 2017-01-01 PROCEDURE — 25010000002 VANCOMYCIN PER 500 MG: Performed by: INTERNAL MEDICINE

## 2017-01-01 PROCEDURE — 80053 COMPREHEN METABOLIC PANEL: CPT | Performed by: NURSE PRACTITIONER

## 2017-01-01 PROCEDURE — 0W9B30Z DRAINAGE OF LEFT PLEURAL CAVITY WITH DRAINAGE DEVICE, PERCUTANEOUS APPROACH: ICD-10-PCS | Performed by: SURGERY

## 2017-01-01 PROCEDURE — 25010000002 PHENYLEPHRINE PER 1 ML: Performed by: NURSE ANESTHETIST, CERTIFIED REGISTERED

## 2017-01-01 PROCEDURE — 96402 CHEMO HORMON ANTINEOPL SQ/IM: CPT

## 2017-01-01 PROCEDURE — 71010 HC CHEST AP: CPT

## 2017-01-01 PROCEDURE — 80202 ASSAY OF VANCOMYCIN: CPT

## 2017-01-01 PROCEDURE — 80048 BASIC METABOLIC PNL TOTAL CA: CPT | Performed by: INTERNAL MEDICINE

## 2017-01-01 PROCEDURE — 93005 ELECTROCARDIOGRAM TRACING: CPT | Performed by: INTERNAL MEDICINE

## 2017-01-01 PROCEDURE — 78306 BONE IMAGING WHOLE BODY: CPT

## 2017-01-01 PROCEDURE — 99233 SBSQ HOSP IP/OBS HIGH 50: CPT | Performed by: PHYSICIAN ASSISTANT

## 2017-01-01 PROCEDURE — 80053 COMPREHEN METABOLIC PANEL: CPT

## 2017-01-01 PROCEDURE — 87449 NOS EACH ORGANISM AG IA: CPT | Performed by: INTERNAL MEDICINE

## 2017-01-01 PROCEDURE — 85025 COMPLETE CBC W/AUTO DIFF WBC: CPT

## 2017-01-01 PROCEDURE — 80053 COMPREHEN METABOLIC PANEL: CPT | Performed by: PHYSICIAN ASSISTANT

## 2017-01-01 PROCEDURE — 71260 CT THORAX DX C+: CPT | Performed by: RADIOLOGY

## 2017-01-01 PROCEDURE — 74177 CT ABD & PELVIS W/CONTRAST: CPT | Performed by: RADIOLOGY

## 2017-01-01 PROCEDURE — 83050 HGB METHEMOGLOBIN QUAN: CPT | Performed by: INTERNAL MEDICINE

## 2017-01-01 PROCEDURE — 82375 ASSAY CARBOXYHB QUANT: CPT | Performed by: INTERNAL MEDICINE

## 2017-01-01 PROCEDURE — 85025 COMPLETE CBC W/AUTO DIFF WBC: CPT | Performed by: PHYSICIAN ASSISTANT

## 2017-01-01 PROCEDURE — 97162 PT EVAL MOD COMPLEX 30 MIN: CPT

## 2017-01-01 PROCEDURE — 87040 BLOOD CULTURE FOR BACTERIA: CPT | Performed by: EMERGENCY MEDICINE

## 2017-01-01 PROCEDURE — 83540 ASSAY OF IRON: CPT | Performed by: PHYSICIAN ASSISTANT

## 2017-01-01 PROCEDURE — 85610 PROTHROMBIN TIME: CPT | Performed by: INTERNAL MEDICINE

## 2017-01-01 PROCEDURE — 85007 BL SMEAR W/DIFF WBC COUNT: CPT

## 2017-01-01 PROCEDURE — 80053 COMPREHEN METABOLIC PANEL: CPT | Performed by: EMERGENCY MEDICINE

## 2017-01-01 PROCEDURE — 99215 OFFICE O/P EST HI 40 MIN: CPT | Performed by: INTERNAL MEDICINE

## 2017-01-01 PROCEDURE — 85025 COMPLETE CBC W/AUTO DIFF WBC: CPT | Performed by: HOSPITALIST

## 2017-01-01 PROCEDURE — 85730 THROMBOPLASTIN TIME PARTIAL: CPT | Performed by: INTERNAL MEDICINE

## 2017-01-01 PROCEDURE — A9561 TC99M OXIDRONATE: HCPCS | Performed by: INTERNAL MEDICINE

## 2017-01-01 PROCEDURE — 83874 ASSAY OF MYOGLOBIN: CPT | Performed by: INTERNAL MEDICINE

## 2017-01-01 PROCEDURE — 83550 IRON BINDING TEST: CPT | Performed by: PHYSICIAN ASSISTANT

## 2017-01-01 PROCEDURE — 0B988ZX DRAINAGE OF LEFT UPPER LOBE BRONCHUS, VIA NATURAL OR ARTIFICIAL OPENING ENDOSCOPIC, DIAGNOSTIC: ICD-10-PCS | Performed by: INTERNAL MEDICINE

## 2017-01-01 PROCEDURE — 0B9G8ZX DRAINAGE OF LEFT UPPER LUNG LOBE, VIA NATURAL OR ARTIFICIAL OPENING ENDOSCOPIC, DIAGNOSTIC: ICD-10-PCS | Performed by: INTERNAL MEDICINE

## 2017-01-01 PROCEDURE — 31624 DX BRONCHOSCOPE/LAVAGE: CPT | Performed by: INTERNAL MEDICINE

## 2017-01-01 PROCEDURE — 0B998ZX DRAINAGE OF LINGULA BRONCHUS, VIA NATURAL OR ARTIFICIAL OPENING ENDOSCOPIC, DIAGNOSTIC: ICD-10-PCS | Performed by: INTERNAL MEDICINE

## 2017-01-01 PROCEDURE — 84443 ASSAY THYROID STIM HORMONE: CPT | Performed by: INTERNAL MEDICINE

## 2017-01-01 PROCEDURE — 93306 TTE W/DOPPLER COMPLETE: CPT

## 2017-01-01 PROCEDURE — 71020 HC CHEST PA AND LATERAL: CPT

## 2017-01-01 PROCEDURE — 25010000002 FUROSEMIDE PER 20 MG: Performed by: HOSPITALIST

## 2017-01-01 PROCEDURE — 99239 HOSP IP/OBS DSCHRG MGMT >30: CPT | Performed by: INTERNAL MEDICINE

## 2017-01-01 PROCEDURE — 70450 CT HEAD/BRAIN W/O DYE: CPT

## 2017-01-01 PROCEDURE — 99221 1ST HOSP IP/OBS SF/LOW 40: CPT | Performed by: SURGERY

## 2017-01-01 PROCEDURE — 99222 1ST HOSP IP/OBS MODERATE 55: CPT | Performed by: INTERNAL MEDICINE

## 2017-01-01 PROCEDURE — 94660 CPAP INITIATION&MGMT: CPT

## 2017-01-01 PROCEDURE — 25010000002 FUROSEMIDE PER 20 MG: Performed by: INTERNAL MEDICINE

## 2017-01-01 PROCEDURE — 70450 CT HEAD/BRAIN W/O DYE: CPT | Performed by: RADIOLOGY

## 2017-01-01 PROCEDURE — 71020 XR CHEST 2 VW: CPT | Performed by: RADIOLOGY

## 2017-01-01 PROCEDURE — 36600 WITHDRAWAL OF ARTERIAL BLOOD: CPT | Performed by: EMERGENCY MEDICINE

## 2017-01-01 PROCEDURE — 82805 BLOOD GASES W/O2 SATURATION: CPT | Performed by: EMERGENCY MEDICINE

## 2017-01-01 PROCEDURE — 25010000002 FUROSEMIDE PER 20 MG: Performed by: PHYSICIAN ASSISTANT

## 2017-01-01 PROCEDURE — 89051 BODY FLUID CELL COUNT: CPT | Performed by: INTERNAL MEDICINE

## 2017-01-01 PROCEDURE — 74177 CT ABD & PELVIS W/CONTRAST: CPT

## 2017-01-01 PROCEDURE — 87116 MYCOBACTERIA CULTURE: CPT | Performed by: INTERNAL MEDICINE

## 2017-01-01 PROCEDURE — 99024 POSTOP FOLLOW-UP VISIT: CPT | Performed by: SURGERY

## 2017-01-01 PROCEDURE — 0 IOPAMIDOL 61 % SOLUTION: Performed by: EMERGENCY MEDICINE

## 2017-01-01 PROCEDURE — 0 TECHNETIUM OXIDRONATE KIT: Performed by: INTERNAL MEDICINE

## 2017-01-01 PROCEDURE — 0 IOPAMIDOL 61 % SOLUTION: Performed by: INTERNAL MEDICINE

## 2017-01-01 PROCEDURE — 93005 ELECTROCARDIOGRAM TRACING: CPT | Performed by: EMERGENCY MEDICINE

## 2017-01-01 PROCEDURE — 99223 1ST HOSP IP/OBS HIGH 75: CPT | Performed by: FAMILY MEDICINE

## 2017-01-01 PROCEDURE — 82306 VITAMIN D 25 HYDROXY: CPT

## 2017-01-01 PROCEDURE — 31623 DX BRONCHOSCOPE/BRUSH: CPT | Performed by: INTERNAL MEDICINE

## 2017-01-01 PROCEDURE — 85025 COMPLETE CBC W/AUTO DIFF WBC: CPT | Performed by: EMERGENCY MEDICINE

## 2017-01-01 PROCEDURE — 25010000002 VANCOMYCIN PER 500 MG

## 2017-01-01 PROCEDURE — 99232 SBSQ HOSP IP/OBS MODERATE 35: CPT | Performed by: FAMILY MEDICINE

## 2017-01-01 PROCEDURE — 85025 COMPLETE CBC W/AUTO DIFF WBC: CPT | Performed by: NURSE PRACTITIONER

## 2017-01-01 PROCEDURE — 0W9B3ZX DRAINAGE OF LEFT PLEURAL CAVITY, PERCUTANEOUS APPROACH, DIAGNOSTIC: ICD-10-PCS | Performed by: INTERNAL MEDICINE

## 2017-01-01 PROCEDURE — 99239 HOSP IP/OBS DSCHRG MGMT >30: CPT | Performed by: PHYSICIAN ASSISTANT

## 2017-01-01 PROCEDURE — 71260 CT THORAX DX C+: CPT

## 2017-01-01 PROCEDURE — C1729 CATH, DRAINAGE: HCPCS | Performed by: SURGERY

## 2017-01-01 PROCEDURE — 99221 1ST HOSP IP/OBS SF/LOW 40: CPT | Performed by: INTERNAL MEDICINE

## 2017-01-01 PROCEDURE — 88342 IMHCHEM/IMCYTCHM 1ST ANTB: CPT | Performed by: INTERNAL MEDICINE

## 2017-01-01 RX ORDER — LIDOCAINE HYDROCHLORIDE 20 MG/ML
INJECTION, SOLUTION INFILTRATION; PERINEURAL AS NEEDED
Status: DISCONTINUED | OUTPATIENT
Start: 2017-01-01 | End: 2017-01-01 | Stop reason: SURG

## 2017-01-01 RX ORDER — ASPIRIN 81 MG/1
81 TABLET ORAL DAILY
Status: CANCELLED | OUTPATIENT
Start: 2017-01-01

## 2017-01-01 RX ORDER — BENAZEPRIL HYDROCHLORIDE 20 MG/1
40 TABLET ORAL DAILY
COMMUNITY

## 2017-01-01 RX ORDER — POTASSIUM CHLORIDE 20 MEQ/1
20 TABLET, EXTENDED RELEASE ORAL ONCE
Status: COMPLETED | OUTPATIENT
Start: 2017-01-01 | End: 2017-01-01

## 2017-01-01 RX ORDER — LANOLIN ALCOHOL/MO/W.PET/CERES
325 CREAM (GRAM) TOPICAL 2 TIMES DAILY WITH MEALS
Qty: 60 TABLET | Refills: 5 | Status: SHIPPED | OUTPATIENT
Start: 2017-01-01 | End: 2018-08-22

## 2017-01-01 RX ORDER — HYDRALAZINE HYDROCHLORIDE 20 MG/ML
10 INJECTION INTRAMUSCULAR; INTRAVENOUS EVERY 6 HOURS PRN
Start: 2017-01-01 | End: 2017-01-01 | Stop reason: HOSPADM

## 2017-01-01 RX ORDER — FUROSEMIDE 10 MG/ML
40 INJECTION INTRAMUSCULAR; INTRAVENOUS ONCE
Status: COMPLETED | OUTPATIENT
Start: 2017-01-01 | End: 2017-01-01

## 2017-01-01 RX ORDER — ALBUTEROL SULFATE 2.5 MG/3ML
2.5 SOLUTION RESPIRATORY (INHALATION) ONCE
Status: COMPLETED | OUTPATIENT
Start: 2017-01-01 | End: 2017-01-01

## 2017-01-01 RX ORDER — NICOTINE POLACRILEX 4 MG
15 LOZENGE BUCCAL
Status: DISCONTINUED | OUTPATIENT
Start: 2017-01-01 | End: 2017-01-01 | Stop reason: HOSPADM

## 2017-01-01 RX ORDER — ONDANSETRON 2 MG/ML
4 INJECTION INTRAMUSCULAR; INTRAVENOUS ONCE AS NEEDED
Status: DISCONTINUED | OUTPATIENT
Start: 2017-01-01 | End: 2017-01-01 | Stop reason: HOSPADM

## 2017-01-01 RX ORDER — DEXTROSE MONOHYDRATE 25 G/50ML
25 INJECTION, SOLUTION INTRAVENOUS
Status: DISCONTINUED | OUTPATIENT
Start: 2017-01-01 | End: 2017-01-01 | Stop reason: HOSPADM

## 2017-01-01 RX ORDER — HEPARIN SODIUM 5000 [USP'U]/ML
5000 INJECTION, SOLUTION INTRAVENOUS; SUBCUTANEOUS EVERY 12 HOURS SCHEDULED
Start: 2017-01-01 | End: 2017-01-01 | Stop reason: HOSPADM

## 2017-01-01 RX ORDER — FUROSEMIDE 20 MG/1
20 TABLET ORAL 2 TIMES DAILY
COMMUNITY

## 2017-01-01 RX ORDER — SACCHAROMYCES BOULARDII 250 MG
250 CAPSULE ORAL 2 TIMES DAILY
Status: DISCONTINUED | OUTPATIENT
Start: 2017-01-01 | End: 2017-01-01 | Stop reason: HOSPADM

## 2017-01-01 RX ORDER — CLONIDINE HYDROCHLORIDE 0.1 MG/1
0.1 TABLET ORAL ONCE
Status: COMPLETED | OUTPATIENT
Start: 2017-01-01 | End: 2017-01-01

## 2017-01-01 RX ORDER — AMOXICILLIN AND CLAVULANATE POTASSIUM 875; 125 MG/1; MG/1
1 TABLET, FILM COATED ORAL EVERY 12 HOURS SCHEDULED
Status: DISCONTINUED | OUTPATIENT
Start: 2017-01-01 | End: 2017-01-01 | Stop reason: HOSPADM

## 2017-01-01 RX ORDER — SACCHAROMYCES BOULARDII 250 MG
250 CAPSULE ORAL 2 TIMES DAILY
Qty: 28 CAPSULE | Refills: 0 | Status: SHIPPED | OUTPATIENT
Start: 2017-01-01

## 2017-01-01 RX ORDER — FENTANYL CITRATE 50 UG/ML
INJECTION, SOLUTION INTRAMUSCULAR; INTRAVENOUS AS NEEDED
Status: DISCONTINUED | OUTPATIENT
Start: 2017-01-01 | End: 2017-01-01 | Stop reason: SURG

## 2017-01-01 RX ORDER — MEPERIDINE HYDROCHLORIDE 25 MG/ML
12.5 INJECTION INTRAMUSCULAR; INTRAVENOUS; SUBCUTANEOUS
Status: ACTIVE | OUTPATIENT
Start: 2017-01-01 | End: 2017-01-01

## 2017-01-01 RX ORDER — FENTANYL CITRATE 50 UG/ML
50 INJECTION, SOLUTION INTRAMUSCULAR; INTRAVENOUS
Status: DISCONTINUED | OUTPATIENT
Start: 2017-01-01 | End: 2017-01-01 | Stop reason: HOSPADM

## 2017-01-01 RX ORDER — SODIUM CHLORIDE 0.9 % (FLUSH) 0.9 %
1-10 SYRINGE (ML) INJECTION AS NEEDED
Status: DISCONTINUED | OUTPATIENT
Start: 2017-01-01 | End: 2017-01-01 | Stop reason: HOSPADM

## 2017-01-01 RX ORDER — PROPOFOL 10 MG/ML
VIAL (ML) INTRAVENOUS AS NEEDED
Status: DISCONTINUED | OUTPATIENT
Start: 2017-01-01 | End: 2017-01-01 | Stop reason: SURG

## 2017-01-01 RX ORDER — PRAVASTATIN SODIUM 20 MG
20 TABLET ORAL DAILY
COMMUNITY

## 2017-01-01 RX ORDER — SODIUM CHLORIDE, SODIUM LACTATE, POTASSIUM CHLORIDE, CALCIUM CHLORIDE 600; 310; 30; 20 MG/100ML; MG/100ML; MG/100ML; MG/100ML
125 INJECTION, SOLUTION INTRAVENOUS CONTINUOUS
Status: DISCONTINUED | OUTPATIENT
Start: 2017-01-01 | End: 2017-01-01

## 2017-01-01 RX ORDER — PRAVASTATIN SODIUM 40 MG
40 TABLET ORAL DAILY
COMMUNITY
End: 2017-01-01

## 2017-01-01 RX ORDER — MIDAZOLAM HYDROCHLORIDE 1 MG/ML
INJECTION INTRAMUSCULAR; INTRAVENOUS AS NEEDED
Status: DISCONTINUED | OUTPATIENT
Start: 2017-01-01 | End: 2017-01-01 | Stop reason: SURG

## 2017-01-01 RX ORDER — HEPARIN SODIUM 5000 [USP'U]/ML
5000 INJECTION, SOLUTION INTRAVENOUS; SUBCUTANEOUS EVERY 12 HOURS SCHEDULED
Status: DISCONTINUED | OUTPATIENT
Start: 2017-01-01 | End: 2017-01-01 | Stop reason: HOSPADM

## 2017-01-01 RX ORDER — HYDRALAZINE HYDROCHLORIDE 20 MG/ML
10 INJECTION INTRAMUSCULAR; INTRAVENOUS EVERY 6 HOURS PRN
Status: DISCONTINUED | OUTPATIENT
Start: 2017-01-01 | End: 2017-01-01 | Stop reason: HOSPADM

## 2017-01-01 RX ORDER — VANCOMYCIN HYDROCHLORIDE
1250
Status: DISCONTINUED | OUTPATIENT
Start: 2017-01-01 | End: 2017-01-01

## 2017-01-01 RX ORDER — ACETAMINOPHEN 325 MG/1
1000 TABLET ORAL ONCE
Status: DISCONTINUED | OUTPATIENT
Start: 2017-01-01 | End: 2017-01-01 | Stop reason: HOSPADM

## 2017-01-01 RX ORDER — LISINOPRIL 10 MG/1
40 TABLET ORAL
Status: DISCONTINUED | OUTPATIENT
Start: 2017-01-01 | End: 2017-01-01

## 2017-01-01 RX ORDER — ASPIRIN 81 MG/1
81 TABLET ORAL DAILY
Status: DISCONTINUED | OUTPATIENT
Start: 2017-01-01 | End: 2017-01-01 | Stop reason: HOSPADM

## 2017-01-01 RX ORDER — MAGNESIUM HYDROXIDE 1200 MG/15ML
LIQUID ORAL AS NEEDED
Status: DISCONTINUED | OUTPATIENT
Start: 2017-01-01 | End: 2017-01-01 | Stop reason: HOSPADM

## 2017-01-01 RX ORDER — IPRATROPIUM BROMIDE AND ALBUTEROL SULFATE 2.5; .5 MG/3ML; MG/3ML
3 SOLUTION RESPIRATORY (INHALATION) EVERY 6 HOURS PRN
Status: DISCONTINUED | OUTPATIENT
Start: 2017-01-01 | End: 2017-01-01 | Stop reason: HOSPADM

## 2017-01-01 RX ORDER — BENAZEPRIL HYDROCHLORIDE 40 MG/1
40 TABLET, FILM COATED ORAL DAILY
COMMUNITY
End: 2017-01-01 | Stop reason: HOSPADM

## 2017-01-01 RX ORDER — SODIUM CHLORIDE 0.9 % (FLUSH) 0.9 %
10 SYRINGE (ML) INJECTION AS NEEDED
Status: DISCONTINUED | OUTPATIENT
Start: 2017-01-01 | End: 2017-01-01 | Stop reason: HOSPADM

## 2017-01-01 RX ORDER — FUROSEMIDE 10 MG/ML
20 INJECTION INTRAMUSCULAR; INTRAVENOUS ONCE
Status: COMPLETED | OUTPATIENT
Start: 2017-01-01 | End: 2017-01-01

## 2017-01-01 RX ORDER — AMOXICILLIN AND CLAVULANATE POTASSIUM 875; 125 MG/1; MG/1
1 TABLET, FILM COATED ORAL EVERY 12 HOURS SCHEDULED
Qty: 14 TABLET | Refills: 0 | Status: SHIPPED | OUTPATIENT
Start: 2017-01-01 | End: 2017-01-01

## 2017-01-01 RX ORDER — LIDOCAINE HYDROCHLORIDE 20 MG/ML
20 INJECTION, SOLUTION INFILTRATION; PERINEURAL
Status: COMPLETED | OUTPATIENT
Start: 2017-01-01 | End: 2017-01-01

## 2017-01-01 RX ORDER — SODIUM CHLORIDE 9 MG/ML
50 INJECTION, SOLUTION INTRAVENOUS CONTINUOUS
Status: DISPENSED | OUTPATIENT
Start: 2017-01-01 | End: 2017-01-01

## 2017-01-01 RX ORDER — IPRATROPIUM BROMIDE AND ALBUTEROL SULFATE 2.5; .5 MG/3ML; MG/3ML
3 SOLUTION RESPIRATORY (INHALATION) ONCE AS NEEDED
Status: DISCONTINUED | OUTPATIENT
Start: 2017-01-01 | End: 2017-01-01 | Stop reason: HOSPADM

## 2017-01-01 RX ORDER — LIDOCAINE HYDROCHLORIDE 40 MG/ML
3 INJECTION, SOLUTION RETROBULBAR; TOPICAL ONCE
Status: COMPLETED | OUTPATIENT
Start: 2017-01-01 | End: 2017-01-01

## 2017-01-01 RX ORDER — OXYCODONE HYDROCHLORIDE AND ACETAMINOPHEN 5; 325 MG/1; MG/1
1 TABLET ORAL ONCE AS NEEDED
Status: DISCONTINUED | OUTPATIENT
Start: 2017-01-01 | End: 2017-01-01 | Stop reason: HOSPADM

## 2017-01-01 RX ORDER — IPRATROPIUM BROMIDE AND ALBUTEROL SULFATE 2.5; .5 MG/3ML; MG/3ML
3 SOLUTION RESPIRATORY (INHALATION) EVERY 6 HOURS PRN
Qty: 360 ML
Start: 2017-01-01

## 2017-01-01 RX ORDER — FLUTICASONE PROPIONATE 50 MCG
2 SPRAY, SUSPENSION (ML) NASAL DAILY PRN
Status: DISCONTINUED | OUTPATIENT
Start: 2017-01-01 | End: 2017-01-01 | Stop reason: HOSPADM

## 2017-01-01 RX ORDER — MEPERIDINE HYDROCHLORIDE 25 MG/ML
12.5 INJECTION INTRAMUSCULAR; INTRAVENOUS; SUBCUTANEOUS
Status: DISCONTINUED | OUTPATIENT
Start: 2017-01-01 | End: 2017-01-01 | Stop reason: HOSPADM

## 2017-01-01 RX ORDER — ASPIRIN 81 MG/1
81 TABLET ORAL DAILY
COMMUNITY

## 2017-01-01 RX ORDER — FLUTICASONE PROPIONATE 50 MCG
2 SPRAY, SUSPENSION (ML) NASAL DAILY PRN
COMMUNITY

## 2017-01-01 RX ORDER — LOSARTAN POTASSIUM 25 MG/1
25 TABLET ORAL DAILY
COMMUNITY
End: 2017-01-01 | Stop reason: HOSPADM

## 2017-01-01 RX ORDER — LOSARTAN POTASSIUM 25 MG/1
25 TABLET ORAL DAILY
Status: CANCELLED | OUTPATIENT
Start: 2017-01-01

## 2017-01-01 RX ORDER — BICALUTAMIDE 50 MG/1
50 TABLET, FILM COATED ORAL DAILY
Qty: 30 TABLET | Refills: 11 | Status: SHIPPED | OUTPATIENT
Start: 2017-01-01

## 2017-01-01 RX ADMIN — POTASSIUM CHLORIDE 20 MEQ: 1500 TABLET, EXTENDED RELEASE ORAL at 12:45

## 2017-01-01 RX ADMIN — HEPARIN SODIUM 5000 UNITS: 5000 INJECTION, SOLUTION INTRAVENOUS; SUBCUTANEOUS at 20:42

## 2017-01-01 RX ADMIN — PHENYLEPHRINE HYDROCHLORIDE 100 MCG: 10 INJECTION, SOLUTION INTRAMUSCULAR; INTRAVENOUS; SUBCUTANEOUS at 13:50

## 2017-01-01 RX ADMIN — ALBUTEROL SULFATE 2.5 MG: 2.5 SOLUTION RESPIRATORY (INHALATION) at 09:10

## 2017-01-01 RX ADMIN — PHENYLEPHRINE HYDROCHLORIDE 200 MCG: 10 INJECTION, SOLUTION INTRAMUSCULAR; INTRAVENOUS; SUBCUTANEOUS at 13:59

## 2017-01-01 RX ADMIN — HEPARIN SODIUM 5000 UNITS: 5000 INJECTION, SOLUTION INTRAVENOUS; SUBCUTANEOUS at 09:02

## 2017-01-01 RX ADMIN — DENOSUMAB 120 MG: 120 INJECTION SUBCUTANEOUS at 10:27

## 2017-01-01 RX ADMIN — DENOSUMAB 120 MG: 120 INJECTION SUBCUTANEOUS at 10:15

## 2017-01-01 RX ADMIN — DOXYCYCLINE 100 MG: 100 INJECTION, POWDER, LYOPHILIZED, FOR SOLUTION INTRAVENOUS at 05:00

## 2017-01-01 RX ADMIN — AMOXICILLIN AND CLAVULANATE POTASSIUM 1 TABLET: 875; 125 TABLET, FILM COATED ORAL at 09:02

## 2017-01-01 RX ADMIN — CLONIDINE HYDROCHLORIDE 0.1 MG: 0.1 TABLET ORAL at 20:44

## 2017-01-01 RX ADMIN — LEUPROLIDE ACETATE 22.5 MG: KIT at 14:05

## 2017-01-01 RX ADMIN — ASPIRIN 81 MG: 81 TABLET ORAL at 12:44

## 2017-01-01 RX ADMIN — PROPOFOL 100 MCG/KG/MIN: 10 INJECTION, EMULSION INTRAVENOUS at 13:45

## 2017-01-01 RX ADMIN — MIDAZOLAM HYDROCHLORIDE 1 MG: 1 INJECTION, SOLUTION INTRAMUSCULAR; INTRAVENOUS at 09:52

## 2017-01-01 RX ADMIN — FENTANYL CITRATE 50 MCG: 50 INJECTION INTRAMUSCULAR; INTRAVENOUS at 09:48

## 2017-01-01 RX ADMIN — DOXYCYCLINE 100 MG: 100 INJECTION, POWDER, LYOPHILIZED, FOR SOLUTION INTRAVENOUS at 04:30

## 2017-01-01 RX ADMIN — LIDOCAINE HYDROCHLORIDE 60 MG: 20 INJECTION, SOLUTION INFILTRATION; PERINEURAL at 13:45

## 2017-01-01 RX ADMIN — HEPARIN SODIUM 5000 UNITS: 5000 INJECTION, SOLUTION INTRAVENOUS; SUBCUTANEOUS at 08:29

## 2017-01-01 RX ADMIN — FUROSEMIDE 20 MG: 10 INJECTION, SOLUTION INTRAMUSCULAR; INTRAVENOUS at 18:12

## 2017-01-01 RX ADMIN — PIPERACILLIN SODIUM,TAZOBACTAM SODIUM 3.38 G: 3; .375 INJECTION, POWDER, FOR SOLUTION INTRAVENOUS at 05:15

## 2017-01-01 RX ADMIN — PROPOFOL 20 MG: 10 INJECTION, EMULSION INTRAVENOUS at 13:45

## 2017-01-01 RX ADMIN — FUROSEMIDE 20 MG: 10 INJECTION, SOLUTION INTRAMUSCULAR; INTRAVENOUS at 11:25

## 2017-01-01 RX ADMIN — FUROSEMIDE 40 MG: 10 INJECTION, SOLUTION INTRAMUSCULAR; INTRAVENOUS at 17:55

## 2017-01-01 RX ADMIN — DOXYCYCLINE 100 MG: 100 INJECTION, POWDER, LYOPHILIZED, FOR SOLUTION INTRAVENOUS at 17:13

## 2017-01-01 RX ADMIN — CEFEPIME HYDROCHLORIDE 1 G: 1 INJECTION, POWDER, FOR SOLUTION INTRAMUSCULAR; INTRAVENOUS at 19:48

## 2017-01-01 RX ADMIN — FENTANYL CITRATE 25 MCG: 50 INJECTION INTRAMUSCULAR; INTRAVENOUS at 09:55

## 2017-01-01 RX ADMIN — DOXYCYCLINE 100 MG: 100 INJECTION, POWDER, LYOPHILIZED, FOR SOLUTION INTRAVENOUS at 04:47

## 2017-01-01 RX ADMIN — METRONIDAZOLE 500 MG: 500 INJECTION, SOLUTION INTRAVENOUS at 13:37

## 2017-01-01 RX ADMIN — DOXYCYCLINE 100 MG: 100 INJECTION, POWDER, LYOPHILIZED, FOR SOLUTION INTRAVENOUS at 04:53

## 2017-01-01 RX ADMIN — PIPERACILLIN SODIUM,TAZOBACTAM SODIUM 3.38 G: 3; .375 INJECTION, POWDER, FOR SOLUTION INTRAVENOUS at 23:27

## 2017-01-01 RX ADMIN — LISINOPRIL 40 MG: 10 TABLET ORAL at 08:22

## 2017-01-01 RX ADMIN — HEPARIN SODIUM 5000 UNITS: 5000 INJECTION, SOLUTION INTRAVENOUS; SUBCUTANEOUS at 08:42

## 2017-01-01 RX ADMIN — METRONIDAZOLE 500 MG: 500 INJECTION, SOLUTION INTRAVENOUS at 21:05

## 2017-01-01 RX ADMIN — HEPARIN SODIUM 5000 UNITS: 5000 INJECTION, SOLUTION INTRAVENOUS; SUBCUTANEOUS at 20:14

## 2017-01-01 RX ADMIN — PIPERACILLIN SODIUM,TAZOBACTAM SODIUM 3.38 G: 3; .375 INJECTION, POWDER, FOR SOLUTION INTRAVENOUS at 05:26

## 2017-01-01 RX ADMIN — HEPARIN SODIUM 5000 UNITS: 5000 INJECTION, SOLUTION INTRAVENOUS; SUBCUTANEOUS at 08:34

## 2017-01-01 RX ADMIN — VANCOMYCIN HYDROCHLORIDE 1250 MG: 1 INJECTION, POWDER, LYOPHILIZED, FOR SOLUTION INTRAVENOUS at 06:48

## 2017-01-01 RX ADMIN — HEPARIN SODIUM 5000 UNITS: 5000 INJECTION, SOLUTION INTRAVENOUS; SUBCUTANEOUS at 19:48

## 2017-01-01 RX ADMIN — PIPERACILLIN SODIUM,TAZOBACTAM SODIUM 3.38 G: 3; .375 INJECTION, POWDER, FOR SOLUTION INTRAVENOUS at 23:42

## 2017-01-01 RX ADMIN — HEPARIN SODIUM 5000 UNITS: 5000 INJECTION, SOLUTION INTRAVENOUS; SUBCUTANEOUS at 20:20

## 2017-01-01 RX ADMIN — MIDAZOLAM HYDROCHLORIDE 1 MG: 1 INJECTION, SOLUTION INTRAMUSCULAR; INTRAVENOUS at 09:47

## 2017-01-01 RX ADMIN — HEPARIN SODIUM 5000 UNITS: 5000 INJECTION, SOLUTION INTRAVENOUS; SUBCUTANEOUS at 08:22

## 2017-01-01 RX ADMIN — DOXYCYCLINE 100 MG: 100 INJECTION, POWDER, LYOPHILIZED, FOR SOLUTION INTRAVENOUS at 14:28

## 2017-01-01 RX ADMIN — PIPERACILLIN SODIUM,TAZOBACTAM SODIUM 3.38 G: 3; .375 INJECTION, POWDER, FOR SOLUTION INTRAVENOUS at 01:14

## 2017-01-01 RX ADMIN — HEPARIN SODIUM 5000 UNITS: 5000 INJECTION, SOLUTION INTRAVENOUS; SUBCUTANEOUS at 21:30

## 2017-01-01 RX ADMIN — DENOSUMAB 120 MG: 120 INJECTION SUBCUTANEOUS at 10:19

## 2017-01-01 RX ADMIN — DOXYCYCLINE 100 MG: 100 INJECTION, POWDER, LYOPHILIZED, FOR SOLUTION INTRAVENOUS at 16:26

## 2017-01-01 RX ADMIN — ASPIRIN 81 MG: 81 TABLET ORAL at 08:29

## 2017-01-01 RX ADMIN — LIDOCAINE HYDROCHLORIDE 60 MG: 20 INJECTION, SOLUTION INFILTRATION; PERINEURAL at 09:52

## 2017-01-01 RX ADMIN — PIPERACILLIN SODIUM,TAZOBACTAM SODIUM 3.38 G: 3; .375 INJECTION, POWDER, FOR SOLUTION INTRAVENOUS at 13:35

## 2017-01-01 RX ADMIN — VANCOMYCIN HYDROCHLORIDE 750 MG: 5 INJECTION, POWDER, LYOPHILIZED, FOR SOLUTION INTRAVENOUS at 06:00

## 2017-01-01 RX ADMIN — Medication 250 MG: at 09:02

## 2017-01-01 RX ADMIN — CEFEPIME HYDROCHLORIDE 1 G: 1 INJECTION, POWDER, FOR SOLUTION INTRAMUSCULAR; INTRAVENOUS at 09:05

## 2017-01-01 RX ADMIN — PHENYLEPHRINE HYDROCHLORIDE 200 MCG: 10 INJECTION, SOLUTION INTRAMUSCULAR; INTRAVENOUS; SUBCUTANEOUS at 13:55

## 2017-01-01 RX ADMIN — HEPARIN SODIUM 5000 UNITS: 5000 INJECTION, SOLUTION INTRAVENOUS; SUBCUTANEOUS at 09:33

## 2017-01-01 RX ADMIN — LEUPROLIDE ACETATE 22.5 MG: KIT at 10:19

## 2017-01-01 RX ADMIN — VANCOMYCIN HYDROCHLORIDE 1250 MG: 1 INJECTION, POWDER, LYOPHILIZED, FOR SOLUTION INTRAVENOUS at 04:48

## 2017-01-01 RX ADMIN — LISINOPRIL 40 MG: 10 TABLET ORAL at 08:28

## 2017-01-01 RX ADMIN — PIPERACILLIN SODIUM,TAZOBACTAM SODIUM 3.38 G: 3; .375 INJECTION, POWDER, FOR SOLUTION INTRAVENOUS at 05:43

## 2017-01-01 RX ADMIN — LIDOCAINE HYDROCHLORIDE 3 ML: 40 INJECTION, SOLUTION RETROBULBAR; TOPICAL at 09:16

## 2017-01-01 RX ADMIN — VANCOMYCIN HYDROCHLORIDE 1000 MG: 5 INJECTION, POWDER, LYOPHILIZED, FOR SOLUTION INTRAVENOUS at 23:36

## 2017-01-01 RX ADMIN — PIPERACILLIN SODIUM,TAZOBACTAM SODIUM 3.38 G: 3; .375 INJECTION, POWDER, FOR SOLUTION INTRAVENOUS at 17:40

## 2017-01-01 RX ADMIN — HEPARIN SODIUM 5000 UNITS: 5000 INJECTION, SOLUTION INTRAVENOUS; SUBCUTANEOUS at 19:45

## 2017-01-01 RX ADMIN — HEPARIN SODIUM 5000 UNITS: 5000 INJECTION, SOLUTION INTRAVENOUS; SUBCUTANEOUS at 20:10

## 2017-01-01 RX ADMIN — LISINOPRIL 40 MG: 10 TABLET ORAL at 08:23

## 2017-01-01 RX ADMIN — HEPARIN SODIUM 5000 UNITS: 5000 INJECTION, SOLUTION INTRAVENOUS; SUBCUTANEOUS at 20:24

## 2017-01-01 RX ADMIN — HEPARIN SODIUM 5000 UNITS: 5000 INJECTION, SOLUTION INTRAVENOUS; SUBCUTANEOUS at 20:12

## 2017-01-01 RX ADMIN — PROPOFOL 20 MG: 10 INJECTION, EMULSION INTRAVENOUS at 10:00

## 2017-01-01 RX ADMIN — SODIUM CHLORIDE 50 ML/HR: 9 INJECTION, SOLUTION INTRAVENOUS at 14:50

## 2017-01-01 RX ADMIN — DOXYCYCLINE 100 MG: 100 INJECTION, POWDER, LYOPHILIZED, FOR SOLUTION INTRAVENOUS at 17:27

## 2017-01-01 RX ADMIN — DENOSUMAB 120 MG: 120 INJECTION SUBCUTANEOUS at 14:05

## 2017-01-01 RX ADMIN — HEPARIN SODIUM 5000 UNITS: 5000 INJECTION, SOLUTION INTRAVENOUS; SUBCUTANEOUS at 08:41

## 2017-01-01 RX ADMIN — ASPIRIN 81 MG: 81 TABLET ORAL at 08:23

## 2017-01-01 RX ADMIN — AMOXICILLIN AND CLAVULANATE POTASSIUM 1 TABLET: 875; 125 TABLET, FILM COATED ORAL at 21:12

## 2017-01-01 RX ADMIN — VANCOMYCIN HYDROCHLORIDE 1000 MG: 5 INJECTION, POWDER, LYOPHILIZED, FOR SOLUTION INTRAVENOUS at 23:33

## 2017-01-01 RX ADMIN — IOPAMIDOL 90 ML: 612 INJECTION, SOLUTION INTRAVENOUS at 21:41

## 2017-01-01 RX ADMIN — ASPIRIN 81 MG: 81 TABLET ORAL at 08:22

## 2017-01-01 RX ADMIN — Medication 10 ML: at 19:54

## 2017-01-01 RX ADMIN — ASPIRIN 81 MG: 81 TABLET ORAL at 08:42

## 2017-01-01 RX ADMIN — LISINOPRIL 40 MG: 10 TABLET ORAL at 08:34

## 2017-01-01 RX ADMIN — FUROSEMIDE 20 MG: 10 INJECTION, SOLUTION INTRAMUSCULAR; INTRAVENOUS at 18:05

## 2017-01-01 RX ADMIN — SODIUM CHLORIDE, POTASSIUM CHLORIDE, SODIUM LACTATE AND CALCIUM CHLORIDE: 600; 310; 30; 20 INJECTION, SOLUTION INTRAVENOUS at 13:38

## 2017-01-01 RX ADMIN — PIPERACILLIN SODIUM,TAZOBACTAM SODIUM 3.38 G: 3; .375 INJECTION, POWDER, FOR SOLUTION INTRAVENOUS at 13:07

## 2017-01-01 RX ADMIN — METRONIDAZOLE 500 MG: 500 INJECTION, SOLUTION INTRAVENOUS at 14:28

## 2017-01-01 RX ADMIN — TECHNETIUM TC 99M OXIDRONATE 1 DOSE: 3.15 INJECTION, POWDER, LYOPHILIZED, FOR SOLUTION INTRAVENOUS at 07:30

## 2017-01-01 RX ADMIN — METRONIDAZOLE 500 MG: 500 INJECTION, SOLUTION INTRAVENOUS at 21:08

## 2017-01-01 RX ADMIN — PROPOFOL 20 MG: 10 INJECTION, EMULSION INTRAVENOUS at 09:52

## 2017-01-01 RX ADMIN — FENTANYL CITRATE 100 MCG: 50 INJECTION INTRAMUSCULAR; INTRAVENOUS at 13:38

## 2017-01-01 RX ADMIN — VANCOMYCIN HYDROCHLORIDE 1250 MG: 1 INJECTION, POWDER, LYOPHILIZED, FOR SOLUTION INTRAVENOUS at 05:27

## 2017-01-01 RX ADMIN — IOPAMIDOL 100 ML: 612 INJECTION, SOLUTION INTRAVENOUS at 19:15

## 2017-01-01 RX ADMIN — DOXYCYCLINE 100 MG: 100 INJECTION, POWDER, LYOPHILIZED, FOR SOLUTION INTRAVENOUS at 17:18

## 2017-01-01 RX ADMIN — CEFEPIME HYDROCHLORIDE 1 G: 1 INJECTION, POWDER, FOR SOLUTION INTRAMUSCULAR; INTRAVENOUS at 08:43

## 2017-01-01 RX ADMIN — DENOSUMAB 120 MG: 120 INJECTION SUBCUTANEOUS at 10:25

## 2017-01-01 RX ADMIN — Medication 250 MG: at 17:57

## 2017-01-01 RX ADMIN — HEPARIN SODIUM 5000 UNITS: 5000 INJECTION, SOLUTION INTRAVENOUS; SUBCUTANEOUS at 09:10

## 2017-01-01 RX ADMIN — PIPERACILLIN SODIUM,TAZOBACTAM SODIUM 3.38 G: 3; .375 INJECTION, POWDER, FOR SOLUTION INTRAVENOUS at 18:07

## 2017-01-01 RX ADMIN — HEPARIN SODIUM 5000 UNITS: 5000 INJECTION, SOLUTION INTRAVENOUS; SUBCUTANEOUS at 21:24

## 2017-01-01 RX ADMIN — CEFEPIME HYDROCHLORIDE 1 G: 1 INJECTION, POWDER, FOR SOLUTION INTRAMUSCULAR; INTRAVENOUS at 20:11

## 2017-01-01 RX ADMIN — HEPARIN SODIUM 5000 UNITS: 5000 INJECTION, SOLUTION INTRAVENOUS; SUBCUTANEOUS at 08:23

## 2017-01-01 RX ADMIN — METRONIDAZOLE 500 MG: 500 INJECTION, SOLUTION INTRAVENOUS at 05:48

## 2017-01-01 RX ADMIN — SODIUM CHLORIDE, POTASSIUM CHLORIDE, SODIUM LACTATE AND CALCIUM CHLORIDE: 600; 310; 30; 20 INJECTION, SOLUTION INTRAVENOUS at 09:47

## 2017-01-01 RX ADMIN — HEPARIN SODIUM 5000 UNITS: 5000 INJECTION, SOLUTION INTRAVENOUS; SUBCUTANEOUS at 09:29

## 2017-01-01 RX ADMIN — FUROSEMIDE 20 MG: 10 INJECTION, SOLUTION INTRAMUSCULAR; INTRAVENOUS at 12:45

## 2017-01-01 RX ADMIN — ASPIRIN 81 MG: 81 TABLET ORAL at 09:06

## 2017-01-01 RX ADMIN — FENTANYL CITRATE 25 MCG: 50 INJECTION INTRAMUSCULAR; INTRAVENOUS at 10:00

## 2017-01-01 RX ADMIN — CEFEPIME HYDROCHLORIDE 1 G: 1 INJECTION, POWDER, FOR SOLUTION INTRAMUSCULAR; INTRAVENOUS at 08:41

## 2017-01-01 RX ADMIN — ASPIRIN 81 MG: 81 TABLET ORAL at 08:41

## 2017-01-01 RX ADMIN — PIPERACILLIN SODIUM,TAZOBACTAM SODIUM 3.38 G: 3; .375 INJECTION, POWDER, FOR SOLUTION INTRAVENOUS at 12:40

## 2017-01-01 RX ADMIN — LIDOCAINE HYDROCHLORIDE 20 ML: 20 INJECTION, SOLUTION INFILTRATION; PERINEURAL at 10:04

## 2017-01-01 RX ADMIN — HEPARIN SODIUM 5000 UNITS: 5000 INJECTION, SOLUTION INTRAVENOUS; SUBCUTANEOUS at 21:12

## 2017-01-01 RX ADMIN — METRONIDAZOLE 500 MG: 500 INJECTION, SOLUTION INTRAVENOUS at 05:49

## 2017-01-01 RX ADMIN — HEPARIN SODIUM 5000 UNITS: 5000 INJECTION, SOLUTION INTRAVENOUS; SUBCUTANEOUS at 09:06

## 2017-01-01 RX ADMIN — METRONIDAZOLE 500 MG: 500 INJECTION, SOLUTION INTRAVENOUS at 15:43

## 2017-01-01 RX ADMIN — DOXYCYCLINE 100 MG: 100 INJECTION, POWDER, LYOPHILIZED, FOR SOLUTION INTRAVENOUS at 04:01

## 2017-01-01 RX ADMIN — METRONIDAZOLE 500 MG: 500 INJECTION, SOLUTION INTRAVENOUS at 06:00

## 2017-05-11 PROBLEM — J90 PLEURAL EFFUSION, LEFT: Status: ACTIVE | Noted: 2017-01-01

## 2017-05-21 PROBLEM — Z85.46 HISTORY OF PROSTATE CANCER: Status: ACTIVE | Noted: 2017-01-01

## 2017-05-21 PROBLEM — C61 STAGE IV ADENOCARCINOMA OF PROSTATE (HCC): Status: ACTIVE | Noted: 2017-01-01

## 2017-05-21 PROBLEM — E11.9 TYPE 2 DIABETES MELLITUS (HCC): Status: ACTIVE | Noted: 2017-01-01

## 2017-05-21 PROBLEM — M19.90 ARTHRITIS: Status: ACTIVE | Noted: 2017-01-01

## 2017-05-21 PROBLEM — E78.5 HYPERLIPIDEMIA: Status: ACTIVE | Noted: 2017-01-01

## 2017-05-21 PROBLEM — M15.9 GENERALIZED OSTEOARTHRITIS: Status: ACTIVE | Noted: 2017-01-01

## 2017-05-21 PROBLEM — R97.20 ELEVATED PSA: Status: ACTIVE | Noted: 2017-01-01

## 2017-05-21 PROBLEM — I10 HYPERTENSION: Status: ACTIVE | Noted: 2017-01-01

## 2017-05-22 PROBLEM — J90 PLEURAL EFFUSION: Status: ACTIVE | Noted: 2017-01-01

## 2017-06-02 NOTE — DISCHARGE INSTRUCTIONS

## 2017-06-06 PROBLEM — C61 PROSTATE CANCER METASTATIC TO BONE (HCC): Status: ACTIVE | Noted: 2017-01-01

## 2017-06-06 PROBLEM — C79.51 PROSTATE CANCER METASTATIC TO BONE (HCC): Status: ACTIVE | Noted: 2017-01-01

## 2017-06-06 NOTE — PROGRESS NOTES
Name:  Rox Coppola  :  1932  Date:  2017     REFERRING PHYSICIAN  Chris King MD    PRIMARY CARE PHYSICIAN  Angelica Gonzáles PA-C    REASON FOR FOLLOWUP  1. Prostate cancer metastatic to bone    2. Stage IV adenocarcinoma of prostate      CHIEF COMPLAINT  Fatigue and occasional flank pain near the site of left-sided Pleurx catheter.    Dear Ms. Gonzáles,    HISTORY OF PRESENT ILLNESS:   I saw Mr. Coppola in initial consultation today in our medical oncology clinic. As you are aware, he is a pleasant, 85 y.o., white male with a history of hypertension, osteoarthritis and prostate cancer (the latter reportedly treated in  with radiation alone) who, in mid-May 2017, presented to [facility] with progressive dyspnea on exertion. He was found to have a left-sided pleural effusion and, ultimately, a ~4.5 cm left hilar soft tissue mass, multiple osteoblastic metastases and a PSA level of 26.5 ng/mL. A PleurX catheter was eventually placed for recurrent effusions, and he was transferred to [facility] for a higher level of care. After being hospitalized there for several days, he was discharged home last week (late May 2017) with plans to have home health intermittently drain his Pleurx and to follow up in our clinic to start definitive anti-hormone therapy for his metastatic prostate cancer.    INTERIM HISTORY:  Mr. Coppola presents to clinic today by himself. He confirms the above history. His only complaint today is of intermittent discomfort in his left side, near the site of his catheter. He has no specific, obvious skeletal pains. Other than this and chronic fatigue, he has no specific complaints.    Past Medical History:   Diagnosis Date   • Adenocarcinoma of prostate, stage 4    • Elevated PSA    • Generalized osteoarthritis    • History of prostate cancer    • Hyperlipidemia    • Hypertension    • Pleural effusion     left side   • Seasonal allergies        Past Surgical History:    Procedure Laterality Date   • BRONCHOSCOPY N/A 5/18/2017    Procedure: BRONCHOSCOPY;  Surgeon: Erick Murrieta MD;  Location:  [facility];  Service:    • CAROTID ENDARTERECTOMY Bilateral    • CHEST TUBE INSERTION N/A 5/16/2017    Procedure: CHEST TUBE INSERTION;  Surgeon: Refugio Centeno MD;  Location:  [facility] OR;  Service:    • INGUINAL HERNIA REPAIR Right        Social History     Social History   • Marital status:      Spouse name: N/A   • Number of children: N/A   • Years of education: N/A     Occupational History   •  Retired     Social History Main Topics   • Smoking status: Never Smoker   • Smokeless tobacco: Never Used   • Alcohol use No   • Drug use: No   • Sexual activity: Defer     Other Topics Concern   • Not on file     Social History Narrative       Family History   Problem Relation Age of Onset   • Heart attack Mother    • No Known Problems Daughter    • No Known Problems Son    • No Known Problems Son        Allergies   Allergen Reactions   • Neomycin Rash     redness       Current Outpatient Prescriptions   Medication Sig Dispense Refill   • aspirin 81 MG EC tablet Take 81 mg by mouth Daily.     • benazepril (LOTENSIN) 20 MG tablet Take 40 mg by mouth Daily.     • fluticasone (FLONASE) 50 MCG/ACT nasal spray 2 sprays into each nostril Daily As Needed for Rhinitis.     • furosemide (LASIX) 20 MG tablet Take 20 mg by mouth 2 (Two) Times a Day.     • ipratropium-albuterol (DUO-NEB) 0.5-2.5 mg/mL nebulizer Take 3 mL by nebulization Every 6 (Six) Hours As Needed for Wheezing or Shortness of Air. 360 mL    • pravastatin (PRAVACHOL) 20 MG tablet Take 20 mg by mouth Daily.     • saccharomyces boulardii (FLORASTOR) 250 MG capsule Take 1 capsule by mouth 2 (Two) Times a Day. 28 capsule 0   • bicalutamide (CASODEX) 50 MG chemo tablet Take 1 tablet by mouth Daily. 30 tablet 11     No current facility-administered medications for this visit.        REVIEW OF SYSTEMS  CONSTITUTIONAL:  No fever, chills or  "night sweats. Chronic fatigue.  EYES:  No blurry vision, diplopia or other vision changes.  ENT:  No hearing loss, nosebleeds or sore throat.  CARDIOVASCULAR:  No palpitations, arrhythmia, syncopal episodes or edema.  PULMONARY:  No hemoptysis, wheezing, chronic cough or shortness of breath.  GASTROINTESTINAL:  No nausea or vomiting.  No constipation or diarrhea. Occasional left-sided flank/lower chest wall pains, as per the HPI above.  GENITOURINARY:  No hematuria, kidney stones or frequent urination.  MUSCULOSKELETAL: Some mild, chronic joint and back pains, stable.  INTEGUMENTARY: No rashes or pruritus.  ENDOCRINE:  No excessive thirst or hot flashes.  HEMATOLOGIC:  No history of free bleeding, spontaneous bleeding or clotting.  IMMUNOLOGIC:  No allergies or frequent infections.  NEUROLOGIC: No numbness, tingling, seizures or weakness.  PSYCHIATRIC:  No anxiety or depression.    PHYSICAL EXAMINATION    /68  Pulse 106  Temp 96.8 °F (36 °C) (Oral)   Resp 18  Ht 66\" (167.6 cm)  Wt 128 lb 12.8 oz (58.4 kg)  SpO2 95%  BMI 20.79 kg/m2    GENERAL:  A well-developed, well-nourished, thin, elderly, white male in no acute distress.  HEENT:  Pupils equally round and reactive to light.  Extraocular muscles intact.  CARDIOVASCULAR:  Regular rate and rhythm.  No murmurs, gallops or rubs.  LUNGS:  Decreased breath sounds in the left lung base, otherwise clear to auscultation bilaterally. Left-sided PleurX catheter with collection bag in place.  ABDOMEN:  Soft, nontender, nondistended with positive bowel sounds.  EXTREMITIES:  No clubbing, cyanosis or edema bilaterally.  SKIN:  No rashes or petechiae.  NEURO:  Cranial nerves grossly intact.  No focal deficits.  PSYCH:  Alert and oriented x3.    LABORATORY    Lab Results   Component Value Date    WBC 11.19 (H) 05/24/2017    HGB 8.0 (L) 05/24/2017    HCT 25.4 (L) 05/24/2017    MCV 86.1 05/24/2017     (H) 05/24/2017    NEUTROABS 5.95 05/24/2017       Lab Results "   Component Value Date     05/24/2017    K 4.0 05/24/2017     05/24/2017    CO2 30.0 05/24/2017    BUN 11 05/24/2017    CREATININE 0.90 05/24/2017    GLUCOSE 95 05/24/2017    CALCIUM 8.6 (L) 05/24/2017    AST 24 05/22/2017    ALT 18 05/22/2017    ALKPHOS 103 (H) 05/22/2017    BILITOT 0.2 (L) 05/22/2017    PROTEINTOT 4.8 (L) 05/22/2017    ALBUMIN 2.70 (L) 05/22/2017     PSA (05/19/2017): 26.510 ng/mL    IMAGING  Bone scan (05/19/2017):  Impression:  1.) Foci of increased tracer uptake involving the axial and appendicular skeleton including left clavicle, sternum, right pelvis and bilateral femoral shafts, as detailed above, which are worrisome for osteoblastic metastases.   2.) Degenerative type uptake seen involving the spine.   3.) Uptake likely related to healing rib fracture left anterolateral sixth rib.    CT Abdomen/Pelvis with contrast (05/19/17):  Impression:  1.) Lung bases show left effusion and left basilar consolidation. Also evidence of left-sided pneumothorax. There is right-sided perihilar patchy airspace disease.  2.) Arthritic changes in the spine with grade 1 anterolisthesis at L5 with respect to S1.    CT Head without contrast (05/19/2017):  Impression: No acute intracranial pathology. Nothing is seen on this exam to specifically account for the patient's symptoms.    CT Chest without contrast (05/17/2017):  Impression:  1.) Small to moderate left pneumothorax. Thoracostomy tube is present.   2.) Left suprahilar soft tissue mass measuring 4.5 cm.   3.) Trace left effusion.   4.) Coronary artery calcifications.    Echocardiogram (05/12/2017):  Impression:  1.) The study is technically difficult for diagnosis. The quality of the study is limited due to poor acoustic windows related to limited views obtained.   2.) Left ventricular function is normal.   3.) Left ventricular diastolic dysfunction (grade II) consistent with pseudonormalization.   4.) There is a small (<1cm) pericardial  effusion adjacent to the right ventricle adjacent to the right atrium. The the effusion is fluid filled. There is no evidence of cardiac tamponade.    CT Chest with contrast (05/11/2017):  Impression:  Large left pleural effusion.    PATHOLOGY  Peritoneal fluid, left (05/12/2017):  Malignant. Adenocarcinoma.    Bronchoscopy brushings and washings (05/18/2017):  Negative for malignant cells.    IMPRESSION AND PLAN  Mr. Coppola is a 85 y.o., white male with:  1. Metastatic prostate cancer: Reportedly initially diagnosed in 1999 with localized disease and treated with radiation alone. The patient had done well from this standpoint for the past eighteen years; however, in May 2017, he presented with progressive dyspnea and was ultimately found to have a left subhilar mass, a malignant left-sided pleural effusion (cytology showed adenocarcinoma) and multiple osteoblastic skeletal lesions (including ones involving the left clavicle, sternum, right pelvis and bilateral femoral shafts). These findings, along with a serum PSA level of 26.5 ng/mL, were consistent with the diagnosis. I had a long discussion with the patient in clinic today regarding this diagnosis its prognosis, apparently reiterating much of what he was previously told while he was hospitalized last month (both here and at [facility]). He is aware that this disease is not curable; but that, particularly given his good performance status (despite his advanced age) it is very treatable, and prolonged remissions are often obtainable through anti-hormone treatment alone (I agree with [facility] oncology's assessment that, despite the apparent visceral involvement, the risks of using systemic chemotherapy are too great to justify its limited, potential additional benefit at this time). The patient is therefore agreeable to starting a combination of LHRH agonism ([id number] Lupron) and antiandrogen therapy (daily PO Casodex). A Rx for the latter was  provided today, and we will plan to give him the first dose of the former within the next week. We will see him back in clinic in 6-7 weeks with a CBC, CMP and PSA for a toxicity/symptom check.  2. Recurrent left sided pleural effusion: As discussed above, assuming the patient's left suprahilar mass is related to issue #1 (and there is no evidence to date that it is not), both it and the effusion will hopefully improve over time with LHRH agonist and antiandrogen therapy. If so, the PleurX catheter can hopefully eventually be removed. In the meantime, we will refer him to local surgery for help with its management.  3. 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 A NM bone scan on 05/19/2017 confirmed multiple lesions, including ones involving the left clavicle, sternum, right pelvis and bilateral femoral shafts. None of these lesions are currently significantly symptomatic. We will start qmonthly denosumab (Xgeva) within the week. If/when he does have more issues with focal pain, then a course(s) of palliative, localized radiation could be considered. Continue to monitor.  The patient was in agreement with these plans.    It is a pleasure to participate in Mr. Coppola's care. Please do not hesitate to call with any questions or concerns that you may have.    A total of 60 minutes were spent coordinating this patient’s care in clinic today; 50 minutes of which were face-to-face with the patient, reviewing his medical history, discussing the diagnosis and its prognosis and counseling on the current treatment and followup plan.  All questions were answered to his satisfaction.    FOLLOW UP  Referral made to general surgery for local help with management of PleurX catheter (as well as ongoing home health). Rx for PO Casodex provided today. Begin [id number] Lupron within the week. Return to clinic in 6-7 weeks with a CBC, CMP and PSA.        This document was electronically signed by CEE Ramirez MD June 6, 2017 2:56  PM      CC: XAVI Hatrley MD Lee Hicks, MD Aaron B. House, MD

## 2017-06-06 NOTE — ED PROVIDER NOTES
Subjective   Patient is a 85 y.o. male presenting with shortness of breath.   Shortness of Breath   Severity:  Mild  Onset quality:  Gradual  Timing:  Intermittent  Progression:  Unchanged  Chronicity:  Chronic  Context: activity    Relieved by:  Position changes (Improves wtih chest tube being drained)  Worsened by:  Activity  Ineffective treatments:  Position changes  Associated symptoms: no abdominal pain, no chest pain, no cough, no diaphoresis, no ear pain, no fever, no headaches, no hemoptysis, no neck pain, no rash, no sore throat, no sputum production, no syncope, no swollen glands, no vomiting and no wheezing    Risk factors: no obesity        Review of Systems   Constitutional: Negative for activity change, appetite change, chills, diaphoresis, fatigue and fever.   HENT: Negative for congestion, ear pain and sore throat.    Eyes: Negative for redness.   Respiratory: Positive for shortness of breath. Negative for cough, hemoptysis, sputum production, chest tightness and wheezing.    Cardiovascular: Negative for chest pain, palpitations, leg swelling and syncope.   Gastrointestinal: Negative for abdominal pain, diarrhea, nausea and vomiting.   Genitourinary: Negative for dysuria and urgency.   Musculoskeletal: Negative for arthralgias, back pain, myalgias and neck pain.   Skin: Negative for pallor, rash and wound.   Neurological: Negative for dizziness, speech difficulty, weakness and headaches.   Psychiatric/Behavioral: Negative for agitation, behavioral problems, confusion and decreased concentration.       Past Medical History:   Diagnosis Date   • Adenocarcinoma of prostate, stage 4    • Elevated PSA    • Generalized osteoarthritis    • History of prostate cancer 1999   • Hyperlipidemia    • Hypertension    • Pleural effusion     left side   • Seasonal allergies        Allergies   Allergen Reactions   • Neomycin Rash     redness       Past Surgical History:   Procedure Laterality Date   • BRONCHOSCOPY  N/A 5/18/2017    Procedure: BRONCHOSCOPY;  Surgeon: Erick Murrieta MD;  Location:  [facility] OR;  Service:    • CAROTID ENDARTERECTOMY Bilateral    • CHEST TUBE INSERTION N/A 5/16/2017    Procedure: CHEST TUBE INSERTION;  Surgeon: Refugio Centeno MD;  Location:  [facility];  Service:    • INGUINAL HERNIA REPAIR Right        Family History   Problem Relation Age of Onset   • Heart attack Mother    • No Known Problems Daughter    • No Known Problems Son    • No Known Problems Son        Social History     Social History   • Marital status:      Spouse name: N/A   • Number of children: N/A   • Years of education: N/A     Occupational History   •  Retired     Social History Main Topics   • Smoking status: Never Smoker   • Smokeless tobacco: Never Used   • Alcohol use No   • Drug use: No   • Sexual activity: Defer     Other Topics Concern   • Not on file     Social History Narrative           Objective   Physical Exam   Constitutional: He is oriented to person, place, and time. He appears well-developed and well-nourished.  Non-toxic appearance. No distress.   HENT:   Head: Normocephalic and atraumatic.   Right Ear: External ear normal.   Left Ear: External ear normal.   Nose: Nose normal.   Mouth/Throat: Oropharynx is clear and moist and mucous membranes are normal. No oropharyngeal exudate. No tonsillar exudate.   Eyes: Conjunctivae, EOM and lids are normal. Pupils are equal, round, and reactive to light.   Neck: Normal range of motion and full passive range of motion without pain. Neck supple. No thyromegaly present.   Cardiovascular: Normal rate, regular rhythm, S1 normal, S2 normal, normal heart sounds, intact distal pulses and normal pulses.    Pulmonary/Chest: Effort normal. No tachypnea. No respiratory distress. He has decreased breath sounds. He has no wheezes. He has rales. He exhibits no tenderness.   Abdominal: Soft. Normal appearance and bowel sounds are normal. He exhibits no distension. There is no  tenderness. There is no rebound and no guarding.   Musculoskeletal: Normal range of motion. He exhibits no edema, tenderness or deformity.   Lymphadenopathy:     He has no cervical adenopathy.   Neurological: He is alert and oriented to person, place, and time. He has normal strength. No cranial nerve deficit or sensory deficit. GCS eye subscore is 4. GCS verbal subscore is 5. GCS motor subscore is 6.   Skin: Skin is warm, dry and intact. No rash noted. He is not diaphoretic. No erythema. No pallor.   Psychiatric: He has a normal mood and affect. His speech is normal and behavior is normal. Judgment and thought content normal. Cognition and memory are normal.   Nursing note and vitals reviewed.      Procedures         ED Course  ED Course   Value Comment By Time   XR Chest 2 View FINDINGS:   Left chest tube identified. No definite pneumothorax. Small left pleural  effusion.   Heart size is stable. Angel Siu MD 06/06 0207    Patient chest tube drained in ED without any complications.  Drained about 500 cc of Serous Fluid. Angel Siu MD 06/06 0207                  MDM  Number of Diagnoses or Management Options  Recurrent left pleural effusion: established and worsening     Amount and/or Complexity of Data Reviewed  Tests in the radiology section of CPT®: ordered and reviewed  Discussion of test results with the performing providers: yes  Independent visualization of images, tracings, or specimens: yes    Risk of Complications, Morbidity, and/or Mortality  Presenting problems: moderate  Diagnostic procedures: moderate  Management options: moderate    Patient Progress  Patient progress: stable      Final diagnoses:   Recurrent left pleural effusion            Angel Siu MD  06/06/17 0204

## 2017-06-13 NOTE — PROGRESS NOTES
Subjective   Rox Coppola is a 85 y.o. male.     History of Present Illness He has metastatic prostate cancer and had a pleurex catheter placed in his left chest about a month ago.  It has been getting drained intermitently and now it is no longer draining much. No other problems with it.     The following portions of the patient's history were reviewed and updated as appropriate: allergies, current medications, past family history, past medical history, past social history, past surgical history and problem list.    Review of Systems    Objective   Physical Exam His tube is not inflamed, It was removed and he had some serous drainage from the tube site so it was sutured closed.     Assessment/Plan   Rox was seen today for follow-up.    Diagnoses and all orders for this visit:    Prostate cancer metastatic to bone    Pleural effusion, left    If he gets short of breat return to ER and may need to be re admitted for another thoracentesis or chest tube placement.

## 2017-06-15 NOTE — DISCHARGE INSTRUCTIONS

## 2017-06-15 NOTE — ED NOTES
Pt resting quietly on stretcher with no complaints.  Pt AAOx4 with no resp distress noted, respirations even and unlabored.  Pt denies any needs at this time.  Skin PWD.  Pt family at bedside. Will continue to monitor and follow plan of care.  Bed rails up x2, bed in lowest position, call light in reach.  Drainage from area remains noted.     Rianna Velasco RN  06/15/17 [id number]

## 2017-06-15 NOTE — ED NOTES
Pt resting quietly on stretcher with no complaints.  Pt AAOx4 with no resp distress noted, respirations even and unlabored.  Pt denies any needs at this time.  Skin PWD.  Pt family at bedside. Will continue to monitor and follow plan of care.  Bed rails up x2, bed in lowest position, call light in reach.  Drainage from area remains noted.     Rianna Velasco RN  06/15/17 [id number]

## 2017-06-15 NOTE — ED NOTES
Pt resting quietly on stretcher with no complaints.  Pt AAOx4 with no resp distress noted, respirations even and unlabored.  Pt denies any needs at this time.  Skin PWD.  Pt family at bedside. Will continue to monitor and follow plan of care.  Bed rails up x2, bed in lowest position, call light in reach.       Rianna Velasco RN  06/15/17 [id number]

## 2017-06-15 NOTE — ED PROVIDER NOTES
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  He has history of recurrent left pleural effusion had a chest tube placed that a few weeks ago had that tube removed 2 days ago.  He has had a constant leak since he's had the tube removed she soaked through 4 shirts and a towel. this morning      Patient is a 85 y.o. male presenting with abdominal pain.   History provided by:  Patient   used: No    Abdominal Pain   Pain location:  LLQ  Pain radiates to:  Does not radiate  Pain severity:  No pain  Onset quality:  Sudden  Duration:  1 day  Timing:  Constant  Chronicity:  New  Context: previous surgery    Relieved by:  Nothing  Worsened by:  Nothing  Ineffective treatments:  None tried  Associated symptoms: no chest pain, no dysuria and no fever    Risk factors: being elderly        Review of Systems   Constitutional: Negative.  Negative for fever.   HENT: Negative.    Respiratory: Negative.    Cardiovascular: Negative.  Negative for chest pain.   Gastrointestinal: Positive for abdominal pain.   Endocrine: Negative.    Genitourinary: Negative.  Negative for dysuria.   Skin: Negative.    Neurological: Negative.    Psychiatric/Behavioral: Negative.    All other systems reviewed and are negative.      Past Medical History:   Diagnosis Date   • Adenocarcinoma of prostate, stage 4    • Elevated PSA    • Generalized osteoarthritis    • History of prostate cancer 1999   • Hyperlipidemia    • Hypertension    • Pleural effusion     left side   • Seasonal allergies        Allergies   Allergen Reactions   • Neomycin Rash     redness       Past Surgical History:   Procedure Laterality Date   • BRONCHOSCOPY N/A 5/18/2017    Procedure: BRONCHOSCOPY;  Surgeon: Erick Murrieta MD;  Location:  [facility];  Service:    • CAROTID ENDARTERECTOMY Bilateral    • CHEST TUBE INSERTION N/A 5/16/2017    Procedure: CHEST TUBE INSERTION;  Surgeon: Refugio Centeno MD;  Location:   [facility];  Service:    • INGUINAL HERNIA REPAIR Right        Family History   Problem Relation Age of Onset   • Heart attack Mother    • No Known Problems Daughter    • No Known Problems Son    • No Known Problems Son        Social History     Social History   • Marital status:      Spouse name: N/A   • Number of children: N/A   • Years of education: N/A     Occupational History   •  Retired     Social History Main Topics   • Smoking status: Never Smoker   • Smokeless tobacco: Never Used   • Alcohol use No   • Drug use: No   • Sexual activity: Defer     Other Topics Concern   • None     Social History Narrative         Objective   Physical Exam   Constitutional: He is oriented to person, place, and time. He appears well-developed and well-nourished. No distress.   HENT:   Head: Normocephalic and atraumatic.   Right Ear: External ear normal.   Left Ear: External ear normal.   Nose: Nose normal.   Eyes: Conjunctivae and EOM are normal. Pupils are equal, round, and reactive to light.   Neck: Normal range of motion. Neck supple. No JVD present. No tracheal deviation present.   Cardiovascular: Normal rate, regular rhythm and normal heart sounds.    No murmur heard.  Pulmonary/Chest: Effort normal and breath sounds normal. No respiratory distress. He has no wheezes.       Abdominal: Soft. Bowel sounds are normal. There is no tenderness.   Musculoskeletal: Normal range of motion. He exhibits no edema or deformity.   Neurological: He is alert and oriented to person, place, and time. No cranial nerve deficit.   Skin: Skin is warm and dry. No rash noted. He is not diaphoretic. No erythema. No pallor.   Psychiatric: He has a normal mood and affect. His behavior is normal. Thought content normal.   Nursing note and vitals reviewed.      Laceration repair  Date/Time: 6/15/2017 2:50 PM  Performed by: JAN ADLER  Authorized by: JAN ADLER   Consent: Verbal consent obtained.  Consent given by:  patient  Patient understanding: patient states understanding of the procedure being performed  Patient identity confirmed: verbally with patient  Body area: trunk  Location details: chest  Laceration length: 1 cm  Foreign bodies: no foreign bodies  Anesthesia: local infiltration    Anesthesia:  Anesthesia: local infiltration  Local Anesthetic: lidocaine 1% without epinephrine   Anesthetic total: 2 mL  Sedation:  Patient sedated: no    Wound skin closure material used: Staples.  Number of sutures: 3  Technique: simple  Approximation: close  Dressing: pressure dressing  Patient tolerance: Patient tolerated the procedure well with no immediate complications               LAB RESULTS  Lab Results (last 24 hours)     ** No results found for the last 24 hours. **          I ordered the above labs and reviewed the results    RADIOLOGY  CT Chest Without Contrast   Final Result   Mass in the right upper lobe with some postobstructive   pneumonitis. There is pleural thickening and pleural fluid in the left   pleural space. There is minimal infiltrate in the left lower lobe as   well.               The radiation dose reduction device was utilized for each scan per the   ALARA (as low as reasonably achievable) protocol.       This report was finalized on 6/15/2017 1:07 PM by Dr. Meir Winslow II, MD.          XR Chest 1 View   Final Result   No change in the patient's cardiopulmonary status after   removal of the chest tube.       This report was finalized on 6/15/2017 11:29 AM by Dr. Meir Winslow II, MD.               I ordered the above noted radiological studies. Interpreted by radiologist. Reviewed by me in PACS.     ED Course  ED Course     Stress patient with Dr. Kale Gutierrez  And Dr. Alvarez.  Discussed with Dr. Jasso who did this thoracotomy and biopsy who said that his lung tumor was metastatic prostate cancer and just staple up his wound get it to quit using and follow-up with his  oncologist.                [facility]    Final diagnoses:   Malignant pleural effusion   Other postoperative complication involving respiratory system       Documentation assistance provided by trevor Quinn.  Information recorded by the scribe was done at my direction and has been verified and validated by me.     Solo Quinn  06/15/17 1037       Refugio Quinn MD  06/15/17 [id number]

## 2017-06-15 NOTE — PROGRESS NOTES
I was called about this patient in the emergency room.  Patient has had apparently been treated for metastatic prostate cancer to the lung.  He has a pleural effusion and had a recent Pleurx catheter, this was removed recently by Dr. Centeno.  Patient presented to emergency room with continued draining from the site.  Yesterday apparently white count was normal and the patient has had no fever.  I discussed this case twice with Dr. Murrieta I discussed this with Dr. Centeno and with .  Patient's CT to me looks like a primary lung tumor as there is a single large spiculated mass not multiple nodules.  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  This would not be suggestive of metastatic prostate cancer.  This fluid now appears to be loculated and I do not think a repeat catheter would help long-term.  Patient most likely needs a VATS procedure for definitive pleural biopsy and/or lung tumor biopsy as this was not diagnostic by bronchoscopy.  Patient also may need a thoracoscopic pleurodesis.  I recommend patient be transferred to [place] where there is CT surgery available

## 2017-06-15 NOTE — ED NOTES
Pt reports that he was seen by Dr. Centeno yesterday and had a chest tube removed yesterday.  Reports that he was informed to come back to the ER if he had any trouble with the area.  The area has 2 external stitches in place and reports that he has drenched 4 shirts and a towel since yesterday with fluid.      Rianna Velasco RN  06/15/17 [id number]

## 2017-06-15 NOTE — ED NOTES
Pt discharged home with family ambulatory, AAOx3 with NADN.     Rianna Velasco, RN  06/15/17 1122

## 2017-06-15 NOTE — ED NOTES
Pt resting quietly on stretcher with no complaints.  Pt AAOx4 with no resp distress noted, respirations even and unlabored.  Pt denies any needs at this time.  Skin PWD.  Pt family at bedside. Will continue to monitor and follow plan of care.  Bed rails up x2, bed in lowest position, call light in reach.       Rianna Velasco RN  06/15/17 [id number]

## 2017-06-20 PROBLEM — Z48.89 SUTURE CHECK: Status: ACTIVE | Noted: 2017-01-01

## 2017-06-20 NOTE — PROGRESS NOTES
Subjective   Rox Coppola is a 85 y.o. male.     History of Present Illness He continued to have drainage and went to the ER last week. He had been recommended to go to [place], but it stopped draining. He is not having the shortness of breath now.     The following portions of the patient's history were reviewed and updated as appropriate: allergies, current medications, past family history, past medical history, past social history, past surgical history and problem list.    Review of Systems    Objective   Physical Exam His wound has no drainage and a suture and a few staples were removed.     Assessment/Plan   Rox was seen today for follow-up.    Diagnoses and all orders for this visit:    Suture check    Return prn

## 2017-06-28 NOTE — PROGRESS NOTES
Interval history since last visit:    Recent hospitalizations: yes     [facility]     Have you had the Influenza Vaccine? no   Would you like to receive this Vaccine today? no    Have you had the Pneumonia Vaccine?  no  Would you like to receive this Vaccine today? no    Subjective    Rox Coppola presents for the following Lung Mass  .    History of Present Illness     Mr. Coppola is here to follow up for lung mass.  Patient was seen by pulmonology service in May 2017.  At this time he was found to have a lung mass and left pleural effusion.  A thoracentesis was done in which pleural fluid revealed adenocarcinoma of the lung.  This was followed by a bronchoscopy that was negative.  Patient had prostate cancer in 1998 and had been doing well until this time. He is following with Dr. Ramirez for cancer management.  He states that since his discharge from the hospital and having the pleurex catheter removed he has been feeling well and hasn't had any shortness of breath.  He was seen in the ED after removal of pleurex catheter due to excess drainage.  This has since resolved.    Review of Systems   Constitutional: Negative for activity change, fatigue and unexpected weight change.   HENT: Negative for congestion, postnasal drip and rhinorrhea.    Respiratory: Positive for shortness of breath. Negative for apnea, cough, chest tightness and wheezing.    Cardiovascular: Negative for chest pain and palpitations.   Gastrointestinal: Negative for nausea.   Allergic/Immunologic: Negative for environmental allergies.   Psychiatric/Behavioral: Negative for agitation and confusion.       Active Problems:  Problem List Items Addressed This Visit        Respiratory    Pleural effusion, left - Primary       Musculoskeletal and Integument    Prostate cancer metastatic to bone       Genitourinary    Stage IV adenocarcinoma of prostate          Past Medical History:  Past Medical History:   Diagnosis Date   •  "Adenocarcinoma of prostate, stage 4    • Elevated PSA    • Generalized osteoarthritis    • History of prostate cancer 1999   • Hyperlipidemia    • Hypertension    • Pleural effusion     left side   • Seasonal allergies        Family History:  Family History   Problem Relation Age of Onset   • Heart attack Mother    • No Known Problems Daughter    • No Known Problems Son    • No Known Problems Son        Social History:  Social History   Substance Use Topics   • Smoking status: Never Smoker   • Smokeless tobacco: Never Used   • Alcohol use No       Current Medications:  Current Outpatient Prescriptions   Medication Sig Dispense Refill   • aspirin 81 MG EC tablet Take 81 mg by mouth Daily.     • benazepril (LOTENSIN) 20 MG tablet Take 40 mg by mouth Daily.     • bicalutamide (CASODEX) 50 MG chemo tablet Take 1 tablet by mouth Daily. 30 tablet 11   • fluticasone (FLONASE) 50 MCG/ACT nasal spray 2 sprays into each nostril Daily As Needed for Rhinitis.     • furosemide (LASIX) 20 MG tablet Take 20 mg by mouth 2 (Two) Times a Day.     • ipratropium-albuterol (DUO-NEB) 0.5-2.5 mg/mL nebulizer Take 3 mL by nebulization Every 6 (Six) Hours As Needed for Wheezing or Shortness of Air. 360 mL    • pravastatin (PRAVACHOL) 20 MG tablet Take 20 mg by mouth Daily.     • saccharomyces boulardii (FLORASTOR) 250 MG capsule Take 1 capsule by mouth 2 (Two) Times a Day. 28 capsule 0     No current facility-administered medications for this visit.        Allergies:  Allergies   Allergen Reactions   • Neomycin Rash     redness       Vitals:  /70 (BP Location: Left arm, Patient Position: Sitting, Cuff Size: Adult)  Pulse 94  Ht 66\" (167.6 cm)  Wt 126 lb (57.2 kg)  SpO2 96%  BMI 20.34 kg/m2    Imaging:    Imaging Results (most recent)     None          Pulmonary Functions Testing Results:    No results found for: FEV1, FVC, AXH7HSB, TLC, DLCO    Results for orders placed or performed in visit on 06/14/17   PSA   Result Value Ref " Range    PSA 16.320 (H) 0.000 - 4.000 ng/mL   Comprehensive metabolic panel   Result Value Ref Range    Glucose 125 (H) 70 - 110 mg/dL    BUN 19 7 - 21 mg/dL    Creatinine 1.13 0.43 - 1.29 mg/dL    Sodium 142 135 - 153 mmol/L    Potassium 4.5 3.5 - 5.3 mmol/L    Chloride 106 99 - 112 mmol/L    CO2 28.8 24.3 - 31.9 mmol/L    Calcium 8.9 7.7 - 10.0 mg/dL    Total Protein 6.5 6.0 - 8.0 g/dL    Albumin 3.40 3.40 - 4.80 g/dL    ALT (SGPT) 12 10 - 44 U/L    AST (SGOT) 18 10 - 34 U/L    Alkaline Phosphatase 178 (H) 40 - 129 U/L    Total Bilirubin 0.2 0.2 - 1.8 mg/dL    eGFR Non African Amer 62 >60 mL/min/1.73    Globulin 3.1 gm/dL    A/G Ratio 1.1 (L) 1.5 - 2.5 g/dL    BUN/Creatinine Ratio 16.8 7.0 - 25.0    Anion Gap 7.2 3.6 - 11.2 mmol/L   Magnesium   Result Value Ref Range    Magnesium 1.9 1.7 - 2.6 mg/dL   Phosphorus   Result Value Ref Range    Phosphorus 3.5 2.7 - 4.5 mg/dL   CBC Auto Differential   Result Value Ref Range    WBC 8.89 4.50 - 12.50 10*3/mm3    RBC 4.10 (L) 4.70 - 6.10 10*6/mm3    Hemoglobin 10.2 (L) 14.0 - 18.0 g/dL    Hematocrit 33.4 (L) 42.0 - 52.0 %    MCV 81.5 80.0 - 94.0 fL    MCH 24.9 (L) 27.0 - 33.0 pg    MCHC 30.5 (L) 33.0 - 37.0 g/dL    RDW 13.9 11.5 - 14.5 %    RDW-SD 41.5 37.0 - 54.0 fl    MPV 9.9 6.0 - 10.0 fL    Platelets 398 130 - 400 10*3/mm3    Neutrophil % 61.8 40.0 - 75.0 %    Lymphocyte % 22.4 16.0 - 46.0 %    Monocyte % 10.3 0.0 - 12.0 %    Eosinophil % 4.9 0.0 - 7.0 %    Basophil % 0.3 0.0 - 2.0 %    Immature Grans % 0.3 0.0 - 0.5 %    Neutrophils, Absolute 5.48 1.40 - 6.50 10*3/mm3    Lymphocytes, Absolute 1.99 1.00 - 3.00 10*3/mm3    Monocytes, Absolute 0.92 (H) 0.10 - 0.90 10*3/mm3    Eosinophils, Absolute 0.44 0.00 - 0.70 10*3/mm3    Basophils, Absolute 0.03 0.00 - 0.30 10*3/mm3    Immature Grans, Absolute 0.03 0.00 - 0.03 10*3/mm3   Osmolality, Calculated   Result Value Ref Range    Osmolality Calc 286.9 273.0 - 305.0 mOsm/kg       Objective   Physical Exam     GENERAL  APPEARANCE: Well developed, well nourished, alert and cooperative, and appears to be in no acute distress.    HEAD: normocephalic.    EYES: PERRL, EOMI. Fundi normal, vision is grossly intact.    NECK: Neck supple.     CARDIAC: Normal S1 and S2. No S3, S4 or murmurs. Rhythm is regular.     LUNGS: Clear to auscultation without rales, rhonchi, wheezing or diminished breath sounds.    ABDOMEN: Positive bowel sounds. Soft, nondistended, nontender.     EXTREMITIES: No significant deformity or joint abnormality. No edema. Peripheral pulses intact. No varicosities.    NEUROLOGICAL: Strength and sensation symmetric and intact throughout.     PSYCHIATRIC: The mental examination revealed the patient was oriented to person, place, and time.       Assessment/Plan      Shortness of breath: likely related to pleural effusion and lung mass.  Patient reports that this has improved and that he has no current shortness of breath.    Pleural effusion:  Pleurex catheter removed in early June.  A chest xray done on 6/15 shows no change since removing the chest tube.  Will continue to monitor.    Lung Mass:  Lung mass found during hospital admission in May 2017.  Bronchoscopy was done and results were negative.  Pleural fluid came back positive for adenocarcinoma of the lungs.  Patient is following with Dr. Ramirez for treatment of metastatic prostate cancer.    CODE status was discussed with patient in detail-for about 10 minutes and patient will discuss with his immediate family and will try and have a living will return.    Scribed for Dr. Murrieta, by TEREZA Perez        ICD-10-CM ICD-9-CM   1. Pleural effusion, left J90 511.9   2. Prostate cancer metastatic to bone C61 185    C79.51 198.5   3. Stage IV adenocarcinoma of prostate C61 185       Return in about 6 months (around 12/28/2017).       Erick JIMENEZ M.D. attest that the above note accurately reflects the work and decisions made  by me.  Patient was seen and  evaluated by Dr. Murrieta, including history of present illness, physical exam, assessment, and treatment plan.  The above note was reviewed and edited by Dr. Murrieta. The patient was  discussed with Miss Gonzales  and I agree with her history physical assessment and plan.

## 2017-07-25 NOTE — PROGRESS NOTES
Name:  Rox Coppola  :  1932  Date:  2017     REFERRING PHYSICIAN  Chris King MD    PRIMARY CARE PHYSICIAN  Angelica Gonzáles PA-C    REASON FOR FOLLOWUP  1. Stage IV adenocarcinoma of prostate      CHIEF COMPLAINT  Improved fatigue and shortness of breath.    Dear Ms. Gonzáles,    HISTORY OF PRESENT ILLNESS:   I saw Mr. Coppola in follow up today in our medical oncology clinic. As you are aware, he is a pleasant, 85 y.o., white male with a history of hypertension, osteoarthritis and prostate cancer (the latter reportedly treated in  with radiation alone) who, in mid-May 2017, presented to [facility] with progressive dyspnea on exertion. He was found to have a left-sided pleural effusion and, ultimately, a ~4.5 cm left hilar soft tissue mass, multiple osteoblastic metastases and a PSA level of 26.5 ng/mL. A PleurX catheter was eventually placed for recurrent effusions, and he was transferred to [facility] for a higher level of care. After being hospitalized there for several days, he was discharged home by late May 2017, with plans to have [facility] intermittently drain his Pleurx and to follow up in our clinic to start definitive anti-hormone therapy for his metastatic prostate cancer. A combination of LHRH agonism ([id number] Lupron) and antiandrogen therapy (daily PO Casodex) was started by early 2017.    INTERIM HISTORY:  Mr. Coppola returns to clinic today for follow up accompanied by his wife. His PleurX catheter was able to be pulled a few weeks ago. Overall, he is feeling much better (and much less short of breath) compared to May/2017. He still has some mild, but improved, chronic fatigue; but he otherwise has no new or other specific complaints.    Past Medical History:   Diagnosis Date   • Adenocarcinoma of prostate, stage 4    • Elevated PSA    • Generalized osteoarthritis    • History of prostate cancer    • Hyperlipidemia    • Hypertension    • Pleural  effusion     left side   • Seasonal allergies        Past Surgical History:   Procedure Laterality Date   • BRONCHOSCOPY N/A 5/18/2017    Procedure: BRONCHOSCOPY;  Surgeon: Erick Murrieta MD;  Location:  [facility] OR;  Service:    • CAROTID ENDARTERECTOMY Bilateral    • CHEST TUBE INSERTION N/A 5/16/2017    Procedure: CHEST TUBE INSERTION;  Surgeon: Refugio Centeno MD;  Location:  [facility] OR;  Service:    • INGUINAL HERNIA REPAIR Right        Social History     Social History   • Marital status:      Spouse name: N/A   • Number of children: N/A   • Years of education: N/A     Occupational History   •  Retired     Social History Main Topics   • Smoking status: Never Smoker   • Smokeless tobacco: Never Used   • Alcohol use No   • Drug use: No   • Sexual activity: Defer     Other Topics Concern   • Not on file     Social History Narrative       Family History   Problem Relation Age of Onset   • Heart attack Mother    • No Known Problems Daughter    • No Known Problems Son    • No Known Problems Son        Allergies   Allergen Reactions   • Neomycin Rash     redness       Current Outpatient Prescriptions   Medication Sig Dispense Refill   • aspirin 81 MG EC tablet Take 81 mg by mouth Daily.     • benazepril (LOTENSIN) 20 MG tablet Take 40 mg by mouth Daily.     • bicalutamide (CASODEX) 50 MG chemo tablet Take 1 tablet by mouth Daily. 30 tablet 11   • fluticasone (FLONASE) 50 MCG/ACT nasal spray 2 sprays into each nostril Daily As Needed for Rhinitis.     • furosemide (LASIX) 20 MG tablet Take 20 mg by mouth 2 (Two) Times a Day.     • ipratropium-albuterol (DUO-NEB) 0.5-2.5 mg/mL nebulizer Take 3 mL by nebulization Every 6 (Six) Hours As Needed for Wheezing or Shortness of Air. 360 mL    • pravastatin (PRAVACHOL) 20 MG tablet Take 20 mg by mouth Daily.     • saccharomyces boulardii (FLORASTOR) 250 MG capsule Take 1 capsule by mouth 2 (Two) Times a Day. 28 capsule 0   • phosphorus (K PHOS NEUTRAL) 155-852-130 MG tablet  Take 1 tablet by mouth Daily for 10 days. 10 tablet 0     No current facility-administered medications for this visit.        REVIEW OF SYSTEMS  CONSTITUTIONAL:  No fever, chills or night sweats. Chronic fatigue, overall improved.  EYES:  No blurry vision, diplopia or other vision changes.  ENT:  No hearing loss, nosebleeds or sore throat.  CARDIOVASCULAR:  No palpitations, arrhythmia, syncopal episodes or edema.  PULMONARY:  No hemoptysis, wheezing, chronic cough or shortness of breath.  GASTROINTESTINAL:  No nausea or vomiting.  No constipation or diarrhea. Occasional left-sided flank/lower chest wall pains, now resolved with removal of PleurX catheter.  GENITOURINARY:  No hematuria, kidney stones or frequent urination.  MUSCULOSKELETAL: Some mild, chronic joint and back pains, stable.  INTEGUMENTARY: No rashes or pruritus.  ENDOCRINE:  No excessive thirst or hot flashes.  HEMATOLOGIC:  No history of free bleeding, spontaneous bleeding or clotting.  IMMUNOLOGIC:  No allergies or frequent infections.  NEUROLOGIC: No numbness, tingling, seizures or weakness.  PSYCHIATRIC:  No anxiety or depression.    PHYSICAL EXAMINATION    /70  Pulse 91  Resp 18  Wt 121 lb (54.9 kg)  SpO2 98%  BMI 19.53 kg/m2    GENERAL:  A well-developed, well-nourished, thin, elderly, white male in no acute distress.  HEENT:  Pupils equally round and reactive to light.  Extraocular muscles intact.  CARDIOVASCULAR:  Regular rate and rhythm.  No murmurs, gallops or rubs.  LUNGS:  Slightly decreased breath sounds in the left lung base, otherwise clear to auscultation bilaterally.  ABDOMEN:  Soft, nontender, nondistended with positive bowel sounds.  EXTREMITIES:  No clubbing, cyanosis or edema bilaterally.  SKIN:  No rashes or petechiae.  NEURO:  Cranial nerves grossly intact.  No focal deficits.  PSYCH:  Alert and oriented x3.    LABORATORY    Lab Results   Component Value Date    WBC 10.85 07/25/2017    HGB 10.6 (L) 07/25/2017    HCT  34.7 (L) 07/25/2017    MCV 74.0 (L) 07/25/2017     (H) 07/25/2017    NEUTROABS 7.02 (H) 07/25/2017       Lab Results   Component Value Date     07/25/2017    K 4.7 07/25/2017     07/25/2017    CO2 26.5 07/25/2017    BUN 13 07/25/2017    CREATININE 0.87 07/25/2017    GLUCOSE 127 (H) 07/25/2017    CALCIUM 8.8 07/25/2017    AST 27 07/25/2017    ALT 18 07/25/2017    ALKPHOS 234 (H) 07/25/2017    BILITOT 0.3 07/25/2017    PROTEINTOT 6.8 07/25/2017    ALBUMIN 3.60 07/25/2017     PSA (07/25/2017): 6.910 ng/mL  PSA (06/14/2017): 16.320 ng/mL  PSA (05/19/2017): 26.510 ng/mL    IMAGING  CT chest without contrast (06/15/2017):  Impression: Mass in the right upper lobe with some postobstructive pneumonitis. There is pleural thickening and pleural fluid in the left pleural space. There is minimal infiltrate in the left lower lobe as well.    Bone scan (05/20/2017):  Impression:  1.) Foci of increased tracer uptake involving the axial and appendicular skeleton including left clavicle, sternum, right pelvis and bilateral femoral shafts, as detailed above, which are worrisome for osteoblastic metastases.   2.) Degenerative type uptake seen involving the spine.   3.) Uptake likely related to healing rib fracture left anterolateral sixth rib.    CT Abdomen/Pelvis with contrast (05/19/17):  Impression:  1.) Lung bases show left effusion and left basilar consolidation. Also evidence of left-sided pneumothorax. There is right-sided perihilar patchy airspace disease.  2.) Arthritic changes in the spine with grade 1 anterolisthesis at L5 with respect to S1.    CT Head without contrast (05/19/2017):  Impression: No acute intracranial pathology. Nothing is seen on this exam to specifically account for the patient's symptoms.    CT Chest without contrast (05/17/2017):  Impression:  1.) Small to moderate left pneumothorax. Thoracostomy tube is present.   2.) Left suprahilar soft tissue mass measuring 4.5 cm.   3.) Trace  left effusion.   4.) Coronary artery calcifications.    Echocardiogram (05/12/2017):  Impression:  1.) The study is technically difficult for diagnosis. The quality of the study is limited due to poor acoustic windows related to limited views obtained.   2.) Left ventricular function is normal.   3.) Left ventricular diastolic dysfunction (grade II) consistent with pseudonormalization.   4.) There is a small (<1cm) pericardial effusion adjacent to the right ventricle adjacent to the right atrium. The the effusion is fluid filled. There is no evidence of cardiac tamponade.    CT Chest with contrast (05/11/2017):  Impression:  Large left pleural effusion.    PATHOLOGY  Peritoneal fluid, left (05/12/2017):  Malignant. Adenocarcinoma.    Bronchoscopy brushings and washings (05/18/2017):  Negative for malignant cells.    IMPRESSION AND PLAN  Mr. Coppola is a 85 y.o., white male with:  1. Metastatic prostate cancer: Reportedly initially diagnosed in 1999 with localized disease and treated with radiation alone. The patient did well from this standpoint for the next eighteen years; however, in May 2017, he presented with progressive dyspnea and was ultimately found to have a left subhilar mass, a malignant left-sided pleural effusion (cytology showed adenocarcinoma) and multiple osteoblastic skeletal lesions (including ones involving the left clavicle, sternum, right pelvis and bilateral femoral shafts). These findings, along with a serum PSA level of 26.5 ng/mL, were consistent with this diagnosis. I had a long discussion with the patient in clinic both at the time of his initial appointment (in early June 2017) and again today (along with his wife) regarding this diagnosis its prognosis, apparently reiterating much of what he was previously told while he was hospitalized in May 2017 (both in [place] and at [facility]). He is aware that this disease is not curable; but that, particularly given his good performance  status (despite his advanced age) it is very treatable, and prolonged remissions are often obtainable through anti-hormone treatment alone (I agreed with [facility] oncology's assessment that, despite the apparent visceral involvement, the risks of using systemic chemotherapy, even for a brief, initial period, were too great to justify its limited, potential additional benefit). The patient was therefore agreeable to starting a combination of LHRH agonism ([id number] Lupron) and antiandrogen therapy (daily PO Casodex), and this was begun by early June 2017. Today, ~six weeks later, his serum PSA level has been steadily decreasing (from ~26 ng/mL on initial presentation in mid-May to ~6.9 ng/mL today); and he is overall symptomatically much improved. We will proceed with this current treatment plan and see him back in clinic in one month with a repeat CBC, CMP, PSA and CT scans of the chest, abdomen and pelvis without contrast.  2. Recurrent left sided pleural effusion: As discussed above, assuming the patient's left suprahilar mass is related to issue #1 (and there is no evidence to date that it is not), both it and the effusion will hopefully continue to improve over time with ongoing LHRH agonist and antiandrogen therapy. Clinically, this appears to be occurring, as he has been getting less short of breath; and his PleurX catheter was able to be pulled by early July. We will repeat CT scans in one month to reassess.  3. 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 A NM bone scan on 05/19/2017 confirmed multiple lesions, including ones involving the left clavicle, sternum, right pelvis and bilateral femoral shafts. None of these lesions were (are are) significantly symptomatic. Continue qmonthly denosumab (second cycle today). If/when he does have more issues with focal pain, then a course(s) of palliative, localized radiation could be considered. Continue to monitor.  The patient and his wife were in  agreement with these plans.    It is a pleasure to participate in Mr. Coppola's care. Please do not hesitate to call with any questions or concerns that you may have.    A total of 30 minutes were spent coordinating this patient’s care in clinic today; 25 minutes of which were face-to-face with the patient and his wife, reviewing his interim medical history, discussing the results of recent labwork and and counseling on the current treatment and followup plan.  All questions were answered to their satisfaction.    FOLLOW UP  Continue PO Casodex, [id number] Lupron and qmonthly denosumab (Xgeva). Return to clinic in 1 month with a CBC, CMP, PSA and CTs of the chest, abdomen and pelvis without contrast.        This document was electronically signed by CEE Ramirez MD July 25, 2017 4:51 PM      CC: XAVI Hartley MD

## 2017-07-25 NOTE — TELEPHONE ENCOUNTER
Pt's wife called and aware of K Phos Neutral daily for 10 days at [facility] and she will  in AM and begin.

## 2017-08-22 NOTE — PROGRESS NOTES
Name:  Rox Coppola  :  1932  Date:  2017     REFERRING PHYSICIAN  Chris King MD    PRIMARY CARE PHYSICIAN  Angelica Gonzáles PA-C    REASON FOR FOLLOWUP  1. Stage IV adenocarcinoma of prostate      CHIEF COMPLAINT  Improved shortness of breath. Mild, persistent fatigue.    Dear Ms. Gonzáles,    HISTORY OF PRESENT ILLNESS:   I saw Mr. Coppola in follow up today in our medical oncology clinic. As you are aware, he is a pleasant, 85 y.o., white male with a history of hypertension, osteoarthritis and prostate cancer (the latter reportedly treated in  with radiation alone) who, in mid-May 2017, presented to [facility] with progressive dyspnea on exertion. He was found to have a left-sided pleural effusion and, ultimately, a ~4.5 cm left hilar soft tissue mass, multiple osteoblastic metastases and a PSA level of 26.5 ng/mL. A PleurX catheter was eventually placed for recurrent effusions, and he was transferred to [facility] for a higher level of care. After being hospitalized there for several days, he was discharged home by late May 2017, with plans to have home health intermittently drain his Pleurx and to follow up in our clinic to start definitive anti-hormone therapy for his metastatic prostate cancer. A combination of LHRH agonism ([id number] Lupron) and antiandrogen therapy (daily PO Casodex) was started by early 2017. Since then, he has symptomatically improved; and his PleurX catheter was able to be pulled by early 2017.    INTERIM HISTORY:  Mr. Coppola returns to clinic today for follow up by himself. Overall, he continues to feel much better (and much less short of breath) compared to May/2017. He still has some mild, persistent fatigue; but he otherwise again has no new or other specific complaints.    Past Medical History:   Diagnosis Date   • Adenocarcinoma of prostate, stage 4    • Elevated PSA    • Generalized osteoarthritis    • History of prostate cancer     • Hyperlipidemia    • Hypertension    • Pleural effusion     left side   • Seasonal allergies        Past Surgical History:   Procedure Laterality Date   • BRONCHOSCOPY N/A 5/18/2017    Procedure: BRONCHOSCOPY;  Surgeon: Erick Murrieta MD;  Location:  [facility] OR;  Service:    • CAROTID ENDARTERECTOMY Bilateral    • CHEST TUBE INSERTION N/A 5/16/2017    Procedure: CHEST TUBE INSERTION;  Surgeon: Refugio Centeno MD;  Location:  [facility] OR;  Service:    • INGUINAL HERNIA REPAIR Right        Social History     Social History   • Marital status:      Spouse name: N/A   • Number of children: N/A   • Years of education: N/A     Occupational History   •  Retired     Social History Main Topics   • Smoking status: Never Smoker   • Smokeless tobacco: Never Used   • Alcohol use No   • Drug use: No   • Sexual activity: Defer     Other Topics Concern   • Not on file     Social History Narrative       Family History   Problem Relation Age of Onset   • Heart attack Mother    • No Known Problems Daughter    • No Known Problems Son    • No Known Problems Son        Allergies   Allergen Reactions   • Neomycin Rash     redness       Current Outpatient Prescriptions   Medication Sig Dispense Refill   • aspirin 81 MG EC tablet Take 81 mg by mouth Daily.     • benazepril (LOTENSIN) 20 MG tablet Take 40 mg by mouth Daily.     • bicalutamide (CASODEX) 50 MG chemo tablet Take 1 tablet by mouth Daily. 30 tablet 11   • ferrous sulfate 325 (65 FE) MG EC tablet Take 1 tablet by mouth 2 (Two) Times a Day With Meals. 60 tablet 5   • fluticasone (FLONASE) 50 MCG/ACT nasal spray 2 sprays into each nostril Daily As Needed for Rhinitis.     • furosemide (LASIX) 20 MG tablet Take 20 mg by mouth 2 (Two) Times a Day.     • ipratropium-albuterol (DUO-NEB) 0.5-2.5 mg/mL nebulizer Take 3 mL by nebulization Every 6 (Six) Hours As Needed for Wheezing or Shortness of Air. 360 mL    • pravastatin (PRAVACHOL) 20 MG tablet Take 20 mg by mouth Daily.     •  saccharomyces boulardii (FLORASTOR) 250 MG capsule Take 1 capsule by mouth 2 (Two) Times a Day. 28 capsule 0     No current facility-administered medications for this visit.        REVIEW OF SYSTEMS  CONSTITUTIONAL:  No fever, chills or night sweats. Chronic fatigue, overall improved but persistent.  EYES:  No blurry vision, diplopia or other vision changes.  ENT:  No hearing loss, nosebleeds or sore throat.  CARDIOVASCULAR:  No palpitations, arrhythmia, syncopal episodes or edema.  PULMONARY:  No hemoptysis, wheezing, chronic cough or shortness of breath.  GASTROINTESTINAL:  No nausea or vomiting.  No constipation or diarrhea. Occasional left-sided flank/lower chest wall pains, now resolved with removal of PleurX catheter.  GENITOURINARY:  No hematuria, kidney stones or frequent urination.  MUSCULOSKELETAL: Some mild, chronic joint and back pains, stable.  INTEGUMENTARY: No rashes or pruritus.  ENDOCRINE:  No excessive thirst or hot flashes.  HEMATOLOGIC:  No history of free bleeding, spontaneous bleeding or clotting.  IMMUNOLOGIC:  No allergies or frequent infections.  NEUROLOGIC: No numbness, tingling, seizures or weakness.  PSYCHIATRIC:  No anxiety or depression.    PHYSICAL EXAMINATION    /67  Pulse 101  Temp 97.9 °F (36.6 °C) (Oral)   Resp 18  Wt 116 lb 12.8 oz (53 kg)  SpO2 98%  BMI 18.85 kg/m2    GENERAL:  A well-developed, well-nourished, thin, elderly, white male in no acute distress.  HEENT:  Pupils equally round and reactive to light.  Extraocular muscles intact.  CARDIOVASCULAR:  Regular rate and rhythm.  No murmurs, gallops or rubs.  LUNGS:  Slightly decreased breath sounds in the left lung base, otherwise clear to auscultation bilaterally.  ABDOMEN:  Soft, nontender, nondistended with positive bowel sounds.  EXTREMITIES:  No clubbing, cyanosis or edema bilaterally.  SKIN:  No rashes or petechiae.  NEURO:  Cranial nerves grossly intact.  No focal deficits.  PSYCH:  Alert and oriented  x3.    LABORATORY    Lab Results   Component Value Date    WBC 10.82 08/22/2017    HGB 11.0 (L) 08/22/2017    HCT 35.0 (L) 08/22/2017    MCV 72.6 (L) 08/22/2017     (H) 08/22/2017    NEUTROABS 7.57 (H) 08/22/2017       Lab Results   Component Value Date     07/25/2017    K 4.7 07/25/2017     07/25/2017    CO2 26.5 07/25/2017    BUN 13 07/25/2017    CREATININE 0.87 07/25/2017    GLUCOSE 127 (H) 07/25/2017    CALCIUM 8.8 07/25/2017    AST 27 07/25/2017    ALT 18 07/25/2017    ALKPHOS 234 (H) 07/25/2017    BILITOT 0.3 07/25/2017    PROTEINTOT 6.8 07/25/2017    ALBUMIN 3.60 07/25/2017     PSA (08/22/2017): pending  PSA (07/25/2017): 6.910 ng/mL  PSA (06/14/2017): 16.320 ng/mL  PSA (05/19/2017): 26.510 ng/mL    IMAGING  CT chest without contrast (06/15/2017):  Impression: Mass in the right upper lobe with some postobstructive pneumonitis. There is pleural thickening and pleural fluid in the left pleural space. There is minimal infiltrate in the left lower lobe as well.    Bone scan (05/20/2017):  Impression:  1.) Foci of increased tracer uptake involving the axial and appendicular skeleton including left clavicle, sternum, right pelvis and bilateral femoral shafts, as detailed above, which are worrisome for osteoblastic metastases.   2.) Degenerative type uptake seen involving the spine.   3.) Uptake likely related to healing rib fracture left anterolateral sixth rib.    CT Abdomen/Pelvis with contrast (05/19/17):  Impression:  1.) Lung bases show left effusion and left basilar consolidation. Also evidence of left-sided pneumothorax. There is right-sided perihilar patchy airspace disease.  2.) Arthritic changes in the spine with grade 1 anterolisthesis at L5 with respect to S1.    CT Head without contrast (05/19/2017):  Impression: No acute intracranial pathology. Nothing is seen on this exam to specifically account for the patient's symptoms.    CT Chest without contrast  (05/17/2017):  Impression:  1.) Small to moderate left pneumothorax. Thoracostomy tube is present.   2.) Left suprahilar soft tissue mass measuring 4.5 cm.   3.) Trace left effusion.   4.) Coronary artery calcifications.    Echocardiogram (05/12/2017):  Impression:  1.) The study is technically difficult for diagnosis. The quality of the study is limited due to poor acoustic windows related to limited views obtained.   2.) Left ventricular function is normal.   3.) Left ventricular diastolic dysfunction (grade II) consistent with pseudonormalization.   4.) There is a small (<1cm) pericardial effusion adjacent to the right ventricle adjacent to the right atrium. The the effusion is fluid filled. There is no evidence of cardiac tamponade.    CT Chest with contrast (05/11/2017):  Impression:  Large left pleural effusion.    CT chest without contrast (08/16/2017):  Impression: Left hilar mass not significantly changed from the previous exam. The lack of IV contrast somewhat limits the ability to measure the mass as there does appear to be a postobstructive process as well. No new adenopathy or effusions are seen. There is a 1 cm left axillary lymph node which is stable.    CT abdomen and pelvis without contrast (08/16/2017):  Impression: Sclerotic densities in the pelvis concerning for skeletal metastasis. Lungs as previously described. No other metastasis seen on today's study.    PATHOLOGY  Peritoneal fluid, left (05/12/2017):  Malignant. Adenocarcinoma.    Bronchoscopy brushings and washings (05/18/2017):  Negative for malignant cells.    IMPRESSION AND PLAN  Mr. Coppola is a 85 y.o., white male with:  1. Metastatic prostate cancer: Reportedly initially diagnosed in 1999 with localized disease and treated with radiation alone. The patient did well from this standpoint for the next eighteen years; however, in May 2017, he presented with progressive dyspnea and was ultimately found to have a left subhilar mass, a  malignant left-sided pleural effusion (cytology showed adenocarcinoma) and multiple osteoblastic skeletal lesions (including ones involving the left clavicle, sternum, right pelvis and bilateral femoral shafts). These findings, along with a serum PSA level of 26.5 ng/mL, were consistent with this diagnosis. I have had multiple long discussions with the patient and/or his wife since his initial appointment in our clinic (in early June 2017) regarding this diagnosis its prognosis, reiterating much of what he was previously told while he was hospitalized in May 2017 (both in [place] and at [facility]). He remains aware that this disease is not curable; but that, particularly given his good performance status (despite his advanced age) it was (and still is) very treatable, and prolonged remissions are often obtainable through anti-hormone treatment alone (I agreed with [facility] oncology's assessment that, despite the apparent visceral involvement, the risks of using systemic chemotherapy, even for a brief, initial period, were too great to justify its limited, potential additional benefit). The patient was therefore agreeable to starting a combination of LHRH agonism ([id number] Lupron) and antiandrogen therapy (daily PO Casodex), and this was begun by early June 2017. Today, ~two and one half months later, his serum PSA level has been steadily decreasing (from ~26 ng/mL on initial presentation in mid-May to ~6.9 ng/mL on 07/25/2017; today's result is pending); and he is overall symptomatically much improved. Repeat CT scans performed on 08/16/2017 show no signs of either disease progression or pleural fluid reaccumulation. We will proceed with this current treatment plan and see him back in clinic in two months (on a denosumab treatment day) with a repeat CBC, CMP and PSA.  2. Recurrent left sided pleural effusion: As discussed above, assuming the patient's left suprahilar mass is related to issue #1 (and  there is no evidence to date that it is not), both it and the effusion will hopefully continue to improve over time with ongoing LHRH agonist and antiandrogen therapy. He has been getting less short of breath; and his PleurX catheter was able to be pulled by early July. The repeat CT of the chest performed on 08/16/2017 (summarized above) did not show any significant reaccumulation of fluid. Continue to monitor.  3. 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 A NM bone scan on 05/19/2017 confirmed multiple lesions, including ones involving the left clavicle, sternum, right pelvis and bilateral femoral shafts. None of these lesions were (are) significantly symptomatic. Continue qmonthly denosumab (third cycle today). If/when he does have more issues with focal pain, then a course(s) of palliative, localized radiation could be considered. Continue to monitor.  4. Iron deficiency anemia: HgB of 11.0 g/dL with an MCV of 72.6 on repeat labs today. While some other factors are likely contributing, an ongoing iron deficiency is likely playing a significant role in his chronic, microcytic anemia. A Rx for ferrous sulfate 325 mg BID was provided today. We will recheck a CBC and iron levels in one month (on his next denosumab treatment day).  The patient was in agreement with these plans.    It is a pleasure to participate in Mr. Coppola's care. Please do not hesitate to call with any questions or concerns that you may have.    A total of 30 minutes were spent coordinating this patient’s care in clinic today; 25 minutes of which were face-to-face with the patient, reviewing his interim medical history, discussing the results of the most recent labwork and repeat CT scans and counseling on the current treatment and followup plan.  All questions were answered to his satisfaction.    FOLLOW UP  Rx for ferrous sulfate 325 mg PO BID provided today. Continue PO Casodex, [id number] Lupron and qmonthly denosumab (Xgeva). Repeat a CBC,  iron panel and ferritin level in 1 month. Return to clinic in 2 months (on a denosumab treatment day) with a CBC, CMP and PSA.        This document was electronically signed by CEE Ramirez MD August 22, 2017 10:28 AM      CC: XAVI Hartley MD

## 2017-08-22 NOTE — TELEPHONE ENCOUNTER
----- Message from CEE Ramirez MD sent at 8/22/2017 10:37 AM EDT -----  Regarding: phosphorus  Phos of 2.3 today. Can you please escribe a Rx for K-phos-neutral daily x 10 days and notify patient to pick this up and start taking along with PO ferrous sulfate? Thanks.

## 2017-09-19 NOTE — CODE DOCUMENTATION
I have made Dr. Ramirez aware that patient has stopped taking his ferrous sulfate pills two days ago d/t coughing and more frequent bowel movements. Since patient has had labs drawn today for CBC and iron profile, MD wanted to see how labs were today. I have reported labs of hgb 10.9, ferritin 168.00, and iron 24. MD ok with monitoring pt's iron levels at this time. No new orders given.

## 2017-10-17 NOTE — PROGRESS NOTES
Name:  Rox Coppola  :  1932  Date:  10/17/2017     REFERRING PHYSICIAN  Chris King MD    PRIMARY CARE PHYSICIAN  Angelica Gonzáles PA-C    REASON FOR FOLLOWUP  1. Stage IV adenocarcinoma of prostate      CHIEF COMPLAINT  Improved shortness of breath. Mild, persistent fatigue. Bilateral ankle swelling.    Dear Ms. Eliceer,    HISTORY OF PRESENT ILLNESS:   I saw Mr. Coppola in follow up today in our medical oncology clinic. As you are aware, he is a pleasant, 85 y.o., white male with a history of hypertension, osteoarthritis and prostate cancer (the latter reportedly treated in  with radiation alone) who, in mid-May 2017, presented to [facility] with progressive dyspnea on exertion. He was found to have a left-sided pleural effusion and, ultimately, a ~4.5 cm left hilar soft tissue mass, multiple osteoblastic metastases and a PSA level of 26.5 ng/mL. A PleurX catheter was eventually placed for recurrent effusions, and he was transferred to [facility] for a higher level of care. After being hospitalized there for several days, he was discharged home by late May 2017, with plans to have [facility] intermittently drain his Pleurx and to follow up in our clinic to start definitive anti-hormone therapy for his metastatic prostate cancer. A combination of LHRH agonism ([id number] Lupron) and antiandrogen therapy (daily PO Casodex) was started by early 2017. Since then, he has symptomatically improved; and his PleurX catheter was able to be pulled by early 2017.    INTERIM HISTORY:  Mr. Coppola returns to clinic today for follow up by himself. Overall, he continues to feel less short of breath than he did in May/2017 (although this is still an issue). He still has some mild, persistent fatigue. His only other complaint today is of persistent, mild swelling in his lower legs and ankles. Otherwise, he again has no new or other specific complaints.    Past Medical History:   Diagnosis  Date   • Adenocarcinoma of prostate, stage 4    • Elevated PSA    • Generalized osteoarthritis    • History of prostate cancer 1999   • Hyperlipidemia    • Hypertension    • Pleural effusion     left side   • Seasonal allergies        Past Surgical History:   Procedure Laterality Date   • BRONCHOSCOPY N/A 5/18/2017    Procedure: BRONCHOSCOPY;  Surgeon: Erick Murrieta MD;  Location: [facility];  Service:    • CAROTID ENDARTERECTOMY Bilateral    • CHEST TUBE INSERTION N/A 5/16/2017    Procedure: CHEST TUBE INSERTION;  Surgeon: Refugio Centeno MD;  Location: [facility] OR;  Service:    • INGUINAL HERNIA REPAIR Right        Social History     Social History   • Marital status:      Spouse name: N/A   • Number of children: N/A   • Years of education: N/A     Occupational History   •  Retired     Social History Main Topics   • Smoking status: Never Smoker   • Smokeless tobacco: Never Used   • Alcohol use No   • Drug use: No   • Sexual activity: Defer     Other Topics Concern   • Not on file     Social History Narrative       Family History   Problem Relation Age of Onset   • Heart attack Mother    • No Known Problems Daughter    • No Known Problems Son    • No Known Problems Son        Allergies   Allergen Reactions   • Neomycin Rash     redness       Current Outpatient Prescriptions   Medication Sig Dispense Refill   • aspirin 81 MG EC tablet Take 81 mg by mouth Daily.     • benazepril (LOTENSIN) 20 MG tablet Take 40 mg by mouth Daily.     • bicalutamide (CASODEX) 50 MG chemo tablet Take 1 tablet by mouth Daily. 30 tablet 11   • ferrous sulfate 325 (65 FE) MG EC tablet Take 1 tablet by mouth 2 (Two) Times a Day With Meals. 60 tablet 5   • fluticasone (FLONASE) 50 MCG/ACT nasal spray 2 sprays into each nostril Daily As Needed for Rhinitis.     • furosemide (LASIX) 20 MG tablet Take 20 mg by mouth 2 (Two) Times a Day.     • ipratropium-albuterol (DUO-NEB) 0.5-2.5 mg/mL nebulizer Take 3 mL by nebulization Every 6 (Six)  Hours As Needed for Wheezing or Shortness of Air. 360 mL    • phosphorus (K PHOS NEUTRAL) 155-852-130 MG tablet Take 1 tablet by mouth Daily. 10 tablet 0   • pravastatin (PRAVACHOL) 20 MG tablet Take 20 mg by mouth Daily.     • saccharomyces boulardii (FLORASTOR) 250 MG capsule Take 1 capsule by mouth 2 (Two) Times a Day. 28 capsule 0     No current facility-administered medications for this visit.        REVIEW OF SYSTEMS  CONSTITUTIONAL: No fever, chills or night sweats. Chronic fatigue, overall improved but persistent.  EYES:  No blurry vision, diplopia or other vision changes.  ENT:  No hearing loss, nosebleeds or sore throat.  CARDIOVASCULAR:  No palpitations, arrhythmia or syncopal episodes. Mild lower extremity edema.  PULMONARY:  No hemoptysis, wheezing or chronic cough. Overall improved, but still persistent shortness of breath compared to early summer 2017.  GASTROINTESTINAL:  No nausea or vomiting.  No constipation or diarrhea. Occasional left-sided flank/lower chest wall pains, now resolved with removal of PleurX catheter.  GENITOURINARY:  No hematuria, kidney stones or frequent urination.  MUSCULOSKELETAL: Some mild, chronic joint and back pains, stable.  INTEGUMENTARY: No rashes or pruritus.  ENDOCRINE:  No excessive thirst or hot flashes.  HEMATOLOGIC:  No history of free bleeding, spontaneous bleeding or clotting.  IMMUNOLOGIC:  No allergies or frequent infections.  NEUROLOGIC: No numbness, tingling, seizures or weakness.  PSYCHIATRIC:  No anxiety or depression.    PHYSICAL EXAMINATION    /61  Pulse 79  Temp 97.6 °F (36.4 °C) (Oral)   Resp 18  SpO2 (!) 78%    GENERAL:  A well-developed, well-nourished, thin, elderly, white male in no acute distress, nasal cannula in place.  HEENT:  Pupils equally round and reactive to light.  Extraocular muscles intact.  CARDIOVASCULAR:  Regular rate and rhythm.  No murmurs, gallops or rubs.  LUNGS:  Slightly decreased breath sounds in the left lung base,  otherwise clear to auscultation bilaterally.  ABDOMEN:  Soft, nontender, nondistended with positive bowel sounds.  EXTREMITIES:  No clubbing, cyanosis or edema bilaterally.  SKIN:  No rashes or petechiae.  NEURO:  Cranial nerves grossly intact.  No focal deficits.  PSYCH:  Alert and oriented x3.    LABORATORY    Lab Results   Component Value Date    WBC 19.53 (H) 10/17/2017    HGB 11.1 (L) 10/17/2017    HCT 34.5 (L) 10/17/2017    MCV 73.9 (L) 10/17/2017     (H) 10/17/2017    NEUTROABS 14.17 (H) 10/17/2017       Lab Results   Component Value Date     (L) 10/17/2017    K 4.5 10/17/2017     10/17/2017    CO2 25.3 10/17/2017    BUN 22 (H) 10/17/2017    CREATININE 0.91 10/17/2017    GLUCOSE 133 (H) 10/17/2017    CALCIUM 8.1 10/17/2017    AST 35 (H) 10/17/2017    ALT 29 10/17/2017    ALKPHOS 387 (H) 10/17/2017    BILITOT 0.4 10/17/2017    PROTEINTOT 5.7 (L) 10/17/2017    ALBUMIN 3.00 (L) 10/17/2017     PSA (10/17/2017): 2.040 ng/mL  PSA (08/22/2017): 4.720 ng/mL  PSA (07/25/2017): 6.910 ng/mL  PSA (06/14/2017): 16.320 ng/mL  PSA (05/19/2017): 26.510 ng/mL    IMAGING  CT chest without contrast (06/15/2017):  Impression: Mass in the right upper lobe with some postobstructive pneumonitis. There is pleural thickening and pleural fluid in the left pleural space. There is minimal infiltrate in the left lower lobe as well.    Bone scan (05/20/2017):  Impression:  1.) Foci of increased tracer uptake involving the axial and appendicular skeleton including left clavicle, sternum, right pelvis and bilateral femoral shafts, as detailed above, which are worrisome for osteoblastic metastases.   2.) Degenerative type uptake seen involving the spine.   3.) Uptake likely related to healing rib fracture left anterolateral sixth rib.    CT Abdomen/Pelvis with contrast (05/19/17):  Impression:  1.) Lung bases show left effusion and left basilar consolidation. Also evidence of left-sided pneumothorax. There is right-sided  perihilar patchy airspace disease.  2.) Arthritic changes in the spine with grade 1 anterolisthesis at L5 with respect to S1.    CT Head without contrast (05/19/2017):  Impression: No acute intracranial pathology. Nothing is seen on this exam to specifically account for the patient's symptoms.    CT Chest without contrast (05/17/2017):  Impression:  1.) Small to moderate left pneumothorax. Thoracostomy tube is present.   2.) Left suprahilar soft tissue mass measuring 4.5 cm.   3.) Trace left effusion.   4.) Coronary artery calcifications.    Echocardiogram (05/12/2017):  Impression:  1.) The study is technically difficult for diagnosis. The quality of the study is limited due to poor acoustic windows related to limited views obtained.   2.) Left ventricular function is normal.   3.) Left ventricular diastolic dysfunction (grade II) consistent with pseudonormalization.   4.) There is a small (<1cm) pericardial effusion adjacent to the right ventricle adjacent to the right atrium. The the effusion is fluid filled. There is no evidence of cardiac tamponade.    CT Chest with contrast (05/11/2017):  Impression:  Large left pleural effusion.    CT chest without contrast (08/16/2017):  Impression: Left hilar mass not significantly changed from the previous exam. The lack of IV contrast somewhat limits the ability to measure the mass as there does appear to be a postobstructive process as well. No new adenopathy or effusions are seen. There is a 1 cm left axillary lymph node which is stable.    CT abdomen and pelvis without contrast (08/16/2017):  Impression: Sclerotic densities in the pelvis concerning for skeletal metastasis. Lungs as previously described. No other metastasis seen on today's study.    PATHOLOGY  Peritoneal fluid, left (05/12/2017):  Malignant. Adenocarcinoma.    Bronchoscopy brushings and washings (05/18/2017):  Negative for malignant cells.    IMPRESSION AND PLAN  Mr. Coppola is a 85 y.o., white male  with:  1. Metastatic prostate cancer: Reportedly initially diagnosed in 1999 with localized disease and treated with radiation alone. The patient did well from this standpoint for the next eighteen years; however, in May 2017, he presented with progressive dyspnea and was ultimately found to have a left subhilar mass, a malignant left-sided pleural effusion (cytology showed adenocarcinoma) and multiple osteoblastic skeletal lesions (including ones involving the left clavicle, sternum, right pelvis and bilateral femoral shafts). These findings, along with a serum PSA level of 26.5 ng/mL, were consistent with this diagnosis. I have had multiple long discussions with the patient and/or his wife since his initial appointment in our clinic (in early June 2017) regarding this diagnosis its prognosis, reiterating much of what he was previously told while he was hospitalized in May 2017 (both in [place] and at [facility]). He remains aware that this disease is not curable; but that, particularly given his good performance status (despite his advanced age) it was (and still is) very treatable, and prolonged remissions are often obtainable through anti-hormone treatment alone (I agreed with [facility] oncology's assessment that, despite the apparent visceral involvement, the risks of using systemic chemotherapy, even for a brief, initial period, were too great in his case to justify its limited, potential additional benefit). The patient was therefore agreeable to starting a combination of LHRH agonism ([id number] Lupron) and antiandrogen therapy (daily PO Casodex), and this was begun by early June 2017. Today, ~four months later, his serum PSA level has been steadily decreasing (from ~26 ng/mL on initial presentation in mid-May to ~2 ng/mL as of today, 10/17/2017); and he is overall symptomatically improved. Repeat CT scans performed on 08/16/2017 showed no signs of either disease progression or pleural fluid  reaccumulation. We will proceed with this current treatment plan and see him back in clinic in six weeks (on the next Lupron treatment day) with a repeat CBC, CMP and PSA.  2. Recurrent left sided pleural effusion: As discussed above, assuming the patient's left suprahilar mass is related to issue #1 (and there is no evidence to date that it is not), both it and the effusion will hopefully continue to improve over time with ongoing LHRH agonist and antiandrogen therapy. Ever since his therapy for issue #1 was started, he has been overall less short of breath; and his PleurX catheter was able to be pulled by early July 2017. The repeat CT of the chest performed on 08/16/2017 (summarized above) did not show any significant reaccumulation of fluid. We will repeat a CT of the chest just prior to his next follow up appointment (in six weeks). Continue to monitor.  3. 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 A NM bone scan on 05/19/2017 confirmed multiple lesions, including ones involving the left clavicle, sternum, right pelvis and bilateral femoral shafts. None of these lesions were (are) significantly symptomatic. Continue qmonthly denosumab (next dose today). If/when he does have more issues with focal pain, then a course(s) of palliative, localized radiation could be considered. Continue to monitor.  4. Anemia: Multifactorial, with a component of ongoing iron deficiency contributing. Continue PO iron supplementation. Continue to monitor.  The patient was in agreement with these plans.    It is a pleasure to participate in Mr. Coppola's care. Please do not hesitate to call with any questions or concerns that you may have.    A total of 30 minutes were spent coordinating this patient’s care in clinic today; 25 minutes of which were face-to-face with the patient, reviewing his interim medical history, discussing the results of the most recent labwork and counseling on the current treatment and followup plan. All  questions were answered to his satisfaction.    FOLLOW UP  Continue PO Casodex, [id number] Lupron and qmonthly denosumab (Xgeva). Return to clinic in ~6 weeks (on the next Lupron treatment day) with a CBC, CMP and PSA.        This document was electronically signed by CEE Ramirez MD October 17, 2017 10:33 AM      CC: XAVI Hartley MD

## 2017-11-09 DIAGNOSIS — C79.51 PROSTATE CANCER METASTATIC TO BONE (HCC): ICD-10-CM

## 2017-11-09 DIAGNOSIS — C61 PROSTATE CANCER METASTATIC TO BONE (HCC): ICD-10-CM

## 2017-11-17 DIAGNOSIS — C79.51 PROSTATE CANCER METASTATIC TO BONE (HCC): ICD-10-CM

## 2017-11-17 DIAGNOSIS — C61 PROSTATE CANCER METASTATIC TO BONE (HCC): ICD-10-CM

## 2017-11-29 ENCOUNTER — APPOINTMENT (OUTPATIENT)
Dept: ONCOLOGY | Facility: HOSPITAL | Age: 82
End: 2017-11-29

## (undated) DEVICE — SPNG GZ WOVN 4X4IN 12PLY 10/BX STRL

## (undated) DEVICE — DEV NDL ASP TRNSBRNCH EXCELON 19G 15MM 130CM

## (undated) DEVICE — ADAPT SWVL FIBROPTIC BRONCH

## (undated) DEVICE — SUT SILK 2/0 FS 18IN 685H

## (undated) DEVICE — SINGLE USE BIOPSY VALVE MAJ-210: Brand: SINGLE USE BIOPSY VALVE (STERILE)

## (undated) DEVICE — KT CATH DRN PLEURL PLEURX/SAFE/T SIL 15.5F 66CM 4BT/1000ML

## (undated) DEVICE — 3M™ STERI-STRIP™ REINFORCED ADHESIVE SKIN CLOSURES, R1547, 1/2 IN X 4 IN (12 MM X 100 MM), 6 STRIPS/ENVELOPE: Brand: 3M™ STERI-STRIP™

## (undated) DEVICE — FRCP BX RADJAW4 PULM WO NDL STD1.8X2 100

## (undated) DEVICE — TUBING, SUCTION, 1/4" X 20', STRAIGHT: Brand: MEDLINE INDUSTRIES, INC.

## (undated) DEVICE — Device

## (undated) DEVICE — GOWN,REINF,POLY,ECL,PP SLV,XL: Brand: MEDLINE

## (undated) DEVICE — PK BASIC 70

## (undated) DEVICE — SYR LUER SLPTP 50ML

## (undated) DEVICE — BRUSH CYTO MULTI APPL

## (undated) DEVICE — SINGLE USE SUCTION VALVE MAJ-209: Brand: SINGLE USE SUCTION VALVE (STERILE)

## (undated) DEVICE — DBD-DRAPE,LAP,CHOLE,W/TROUGHS,STERILE: Brand: MEDLINE

## (undated) DEVICE — HOLDER: Brand: DEROYAL

## (undated) DEVICE — THE BITE BLOCK MAXI, LATEX FREE STRAP IS USED TO PROTECT THE ENDOSCOPE INSERTION TUBE FROM BEING BITTEN BY THE PATIENT.

## (undated) DEVICE — OCEAN DRAIN, SINGLE, IN-LINE, STOP: Brand: OCEAN

## (undated) DEVICE — KT DRN PLEURL PLEURX NO/CATH 1BT/500M